# Patient Record
Sex: FEMALE | Race: WHITE | NOT HISPANIC OR LATINO | Employment: STUDENT | URBAN - METROPOLITAN AREA
[De-identification: names, ages, dates, MRNs, and addresses within clinical notes are randomized per-mention and may not be internally consistent; named-entity substitution may affect disease eponyms.]

---
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
## 2021-01-03 ENCOUNTER — MYC MEDICAL ADVICE (OUTPATIENT)
Dept: FAMILY MEDICINE | Facility: CLINIC | Age: 21
End: 2021-01-03

## 2021-01-03 DIAGNOSIS — F33.1 MODERATE EPISODE OF RECURRENT MAJOR DEPRESSIVE DISORDER (H): ICD-10-CM

## 2021-01-03 DIAGNOSIS — F41.1 GENERALIZED ANXIETY DISORDER: ICD-10-CM

## 2021-01-04 ENCOUNTER — VIRTUAL VISIT (OUTPATIENT)
Dept: FAMILY MEDICINE | Facility: CLINIC | Age: 21
End: 2021-01-04

## 2021-01-04 DIAGNOSIS — F41.1 GENERALIZED ANXIETY DISORDER: ICD-10-CM

## 2021-01-04 DIAGNOSIS — F32.0 MILD SINGLE CURRENT EPISODE OF MAJOR DEPRESSIVE DISORDER (H): Primary | ICD-10-CM

## 2021-01-04 DIAGNOSIS — Z30.41 ENCOUNTER FOR SURVEILLANCE OF CONTRACEPTIVE PILLS: ICD-10-CM

## 2021-01-04 PROBLEM — F41.9 ANXIETY: Status: RESOLVED | Noted: 2020-09-23 | Resolved: 2021-01-04

## 2021-01-04 PROCEDURE — 99214 OFFICE O/P EST MOD 30 MIN: CPT | Mod: 95 | Performed by: FAMILY MEDICINE

## 2021-01-04 RX ORDER — SERTRALINE HYDROCHLORIDE 100 MG/1
100 TABLET, FILM COATED ORAL DAILY
Qty: 30 TABLET | Refills: 0 | Status: SHIPPED | OUTPATIENT
Start: 2021-01-04 | End: 2021-01-04

## 2021-01-04 RX ORDER — SERTRALINE HYDROCHLORIDE 100 MG/1
100 TABLET, FILM COATED ORAL DAILY
Qty: 30 TABLET | Refills: 3 | Status: SHIPPED | OUTPATIENT
Start: 2021-01-04 | End: 2021-08-20

## 2021-01-04 ASSESSMENT — ANXIETY QUESTIONNAIRES
IF YOU CHECKED OFF ANY PROBLEMS ON THIS QUESTIONNAIRE, HOW DIFFICULT HAVE THESE PROBLEMS MADE IT FOR YOU TO DO YOUR WORK, TAKE CARE OF THINGS AT HOME, OR GET ALONG WITH OTHER PEOPLE: VERY DIFFICULT
3. WORRYING TOO MUCH ABOUT DIFFERENT THINGS: NEARLY EVERY DAY
1. FEELING NERVOUS, ANXIOUS, OR ON EDGE: NEARLY EVERY DAY
GAD7 TOTAL SCORE: 14
5. BEING SO RESTLESS THAT IT IS HARD TO SIT STILL: NOT AT ALL
2. NOT BEING ABLE TO STOP OR CONTROL WORRYING: NEARLY EVERY DAY
7. FEELING AFRAID AS IF SOMETHING AWFUL MIGHT HAPPEN: MORE THAN HALF THE DAYS
6. BECOMING EASILY ANNOYED OR IRRITABLE: SEVERAL DAYS

## 2021-01-04 ASSESSMENT — PATIENT HEALTH QUESTIONNAIRE - PHQ9
5. POOR APPETITE OR OVEREATING: MORE THAN HALF THE DAYS
SUM OF ALL RESPONSES TO PHQ QUESTIONS 1-9: 11

## 2021-01-04 NOTE — PROGRESS NOTES
"Israel Eaton is a 20 year old female who is being evaluated via a billable telephone visit.      What phone number would you like to be contacted at? [phone]  How would you like to obtain your AVS? [facility]  Assessment & Plan   Problem List Items Addressed This Visit     Mild single current episode of major depressive disorder (H) - Primary     Improved, much more troubled by her anxiety.  Recommend continuing sertraline at this time         Relevant Medications    sertraline (ZOLOFT) 100 MG tablet    Generalized anxiety disorder     She has yet to try Buspar. Fill Rx, recheck 2 weeks. Anxiety prominent, \"can't stop my thoughts) per pt.  She has appt with her old counselor         Relevant Medications    sertraline (ZOLOFT) 100 MG tablet    Encounter for surveillance of contraceptive pills     Pt was unaware of outstanding Rx for OCP                                         Tobacco Cessation:   reports that she has been smoking other. She has never used smokeless tobacco.            No follow-ups on file.    Sean Koo MD  [facility]     Israel Eaton is a 20 year old female who presents     HPI       Depression and Anxiety Follow-Up    How are you doing with your depression since your last visit? Improved     How are you doing with your anxiety since your last visit?  Worsened     Are you having other symptoms that might be associated with depression or anxiety? No    Have you had a significant life event? No     Do you have any concerns with your use of alcohol or other drugs? No    Social History     Tobacco Use     Smoking status: Current Every Day Smoker     Types: Other     Smokeless tobacco: Never Used     Tobacco comment: Occasionally Vapes   Substance Use Topics     Alcohol use: No     Alcohol/week: 0.0 standard drinks     Drug use: No     PHQ 9/24/2020 10/29/2020 12/1/2020   PHQ-9 Total Score 13 9 11   Q9: Thoughts of better off dead/self-harm past 2 " weeks Not at all Not at all Not at all     DAYO-7 SCORE 9/24/2020 10/29/2020 12/1/2020   Total Score 13 6 13     Last PHQ-9 1/4/2021   1.  Little interest or pleasure in doing things 0   2.  Feeling down, depressed, or hopeless 1   3.  Trouble falling or staying asleep, or sleeping too much 3   4.  Feeling tired or having little energy 2   5.  Poor appetite or overeating 0   6.  Feeling bad about yourself 1   7.  Trouble concentrating 3   8.  Moving slowly or restless 1   Q9: Thoughts of better off dead/self-harm past 2 weeks 0   PHQ-9 Total Score 11   Difficulty at work, home, or with people Somewhat difficult     DAYO-7  1/4/2021   1. Feeling nervous, anxious, or on edge 3   2. Not being able to stop or control worrying 3   3. Worrying too much about different things 3   4. Trouble relaxing 2   5. Being so restless that it is hard to sit still 0   6. Becoming easily annoyed or irritable 1   7. Feeling afraid, as if something awful might happen 2   DAYO-7 Total Score 14   If you checked any problems, how difficult have they made it for you to do your work, take care of things at home, or get along with other people? Very difficult       Suicide Assessment Five-step Evaluation and Treatment (SAFE-T)      How many servings of fruits and vegetables do you eat daily?  0-1    On average, how many sweetened beverages do you drink each day (Examples: soda, juice, sweet tea, etc.  Do NOT count diet or artificially sweetened beverages)?   3    How many days per week do you exercise enough to make your heart beat faster? 3 or less    How many minutes a day do you exercise enough to make your heart beat faster? 30 - 60  How many days per week do you miss taking your medication? 2-4    What makes it hard for you to take your medications?  remembering to take        Review of Systems   She cannot stop her thoughts.  She is not suicidal.  No medicine intolerance      Objective           Vitals:  No vitals were obtained today due to  virtual visit.    Physical Exam   no distress and scattered  PSYCH: Alert and oriented times 3; coherent speech, normal   rate and volume, thought is tangential  RESP: No cough, no audible wheezing, able to talk in full sentences  Remainder of exam unable to be completed due to telephone visits                Phone call duration: 7 minutes

## 2021-01-04 NOTE — ASSESSMENT & PLAN NOTE
"She has yet to try Buspar. Fill Rx, recheck 2 weeks. Anxiety prominent, \"can't stop my thoughts) per pt.  She has appt with her old counselor  "

## 2021-01-05 ASSESSMENT — ANXIETY QUESTIONNAIRES: GAD7 TOTAL SCORE: 14

## 2021-08-20 DIAGNOSIS — F41.1 GENERALIZED ANXIETY DISORDER: ICD-10-CM

## 2021-08-20 DIAGNOSIS — Z30.09 BIRTH CONTROL COUNSELING: ICD-10-CM

## 2021-08-20 RX ORDER — SERTRALINE HYDROCHLORIDE 100 MG/1
100 TABLET, FILM COATED ORAL DAILY
Qty: 30 TABLET | Refills: 0 | Status: SHIPPED | OUTPATIENT
Start: 2021-08-20 | End: 2021-09-29

## 2021-08-20 RX ORDER — NORETHINDRONE ACETATE AND ETHINYL ESTRADIOL .03; 1.5 MG/1; MG/1
1 TABLET ORAL DAILY
Qty: 84 TABLET | Refills: 0 | Status: SHIPPED | OUTPATIENT
Start: 2021-08-20 | End: 2021-11-30

## 2021-08-20 NOTE — TELEPHONE ENCOUNTER
"Prescription approved per [facility] Refill Protocol.  Satya TRAN RN

## 2021-08-20 NOTE — TELEPHONE ENCOUNTER
Reason for Call:  Other prescription    Detailed comments: Patient is requesting medication for both; sertraline (ZOLOFT) 100 MG norethindrone-ethinyl estradiol (MICROGESTIN 1.5/30) 1.5-30 MG-MCG tablet. Patient has checked with pharmacy no refills available. Please call patient with any updates.    Phone Number Patient can be reached at: Cell number on file:    Telephone Information:   Mobile [phone]       Best Time: anytime    Can we leave a detailed message on this number? YES    Call taken on 8/20/2021 at 2:40 PM by Jeniffer Cartagena

## 2021-09-13 ENCOUNTER — OFFICE VISIT (OUTPATIENT)
Dept: FAMILY MEDICINE | Facility: CLINIC | Age: 21
End: 2021-09-13
Payer: COMMERCIAL

## 2021-09-13 ENCOUNTER — NURSE TRIAGE (OUTPATIENT)
Dept: NURSING | Facility: CLINIC | Age: 21
End: 2021-09-13

## 2021-09-13 VITALS
SYSTOLIC BLOOD PRESSURE: 118 MMHG | BODY MASS INDEX: 21.6 KG/M2 | HEIGHT: 64 IN | OXYGEN SATURATION: 96 % | WEIGHT: 126.5 LBS | DIASTOLIC BLOOD PRESSURE: 82 MMHG | HEART RATE: 78 BPM | TEMPERATURE: 98.4 F

## 2021-09-13 DIAGNOSIS — Z11.3 SCREENING FOR STDS (SEXUALLY TRANSMITTED DISEASES): ICD-10-CM

## 2021-09-13 DIAGNOSIS — Z00.00 ROUTINE GENERAL MEDICAL EXAMINATION AT A HEALTH CARE FACILITY: Primary | ICD-10-CM

## 2021-09-13 DIAGNOSIS — K90.0 CELIAC DISEASE: ICD-10-CM

## 2021-09-13 DIAGNOSIS — H61.23 BILATERAL IMPACTED CERUMEN: ICD-10-CM

## 2021-09-13 DIAGNOSIS — Z11.59 NEED FOR HEPATITIS C SCREENING TEST: ICD-10-CM

## 2021-09-13 DIAGNOSIS — Z23 NEED FOR PROPHYLACTIC VACCINATION AND INOCULATION AGAINST INFLUENZA: ICD-10-CM

## 2021-09-13 DIAGNOSIS — Z23 NEED FOR VACCINATION: ICD-10-CM

## 2021-09-13 DIAGNOSIS — Z11.4 SCREENING FOR HIV (HUMAN IMMUNODEFICIENCY VIRUS): ICD-10-CM

## 2021-09-13 LAB
ERYTHROCYTE [DISTWIDTH] IN BLOOD BY AUTOMATED COUNT: 14.8 % (ref 10–15)
HCT VFR BLD AUTO: 40.7 % (ref 35–47)
HGB BLD-MCNC: 13.3 G/DL (ref 11.7–15.7)
MCH RBC QN AUTO: 28.4 PG (ref 26.5–33)
MCHC RBC AUTO-ENTMCNC: 32.7 G/DL (ref 31.5–36.5)
MCV RBC AUTO: 87 FL (ref 78–100)
PLATELET # BLD AUTO: 290 10E3/UL (ref 150–450)
RBC # BLD AUTO: 4.69 10E6/UL (ref 3.8–5.2)
WBC # BLD AUTO: 7.4 10E3/UL (ref 4–11)

## 2021-09-13 PROCEDURE — 69209 REMOVE IMPACTED EAR WAX UNI: CPT | Mod: 50 | Performed by: FAMILY MEDICINE

## 2021-09-13 PROCEDURE — 90732 PPSV23 VACC 2 YRS+ SUBQ/IM: CPT | Performed by: FAMILY MEDICINE

## 2021-09-13 PROCEDURE — 80053 COMPREHEN METABOLIC PANEL: CPT | Performed by: FAMILY MEDICINE

## 2021-09-13 PROCEDURE — 85027 COMPLETE CBC AUTOMATED: CPT | Performed by: FAMILY MEDICINE

## 2021-09-13 PROCEDURE — 99395 PREV VISIT EST AGE 18-39: CPT | Mod: 25 | Performed by: FAMILY MEDICINE

## 2021-09-13 PROCEDURE — 87491 CHLMYD TRACH DNA AMP PROBE: CPT | Mod: XS | Performed by: FAMILY MEDICINE

## 2021-09-13 PROCEDURE — 99000 SPECIMEN HANDLING OFFICE-LAB: CPT | Performed by: FAMILY MEDICINE

## 2021-09-13 PROCEDURE — 36415 COLL VENOUS BLD VENIPUNCTURE: CPT | Performed by: FAMILY MEDICINE

## 2021-09-13 PROCEDURE — 86803 HEPATITIS C AB TEST: CPT | Performed by: FAMILY MEDICINE

## 2021-09-13 PROCEDURE — 90472 IMMUNIZATION ADMIN EACH ADD: CPT | Performed by: FAMILY MEDICINE

## 2021-09-13 PROCEDURE — 82784 ASSAY IGA/IGD/IGG/IGM EACH: CPT | Performed by: FAMILY MEDICINE

## 2021-09-13 PROCEDURE — 86256 FLUORESCENT ANTIBODY TITER: CPT | Mod: 90 | Performed by: FAMILY MEDICINE

## 2021-09-13 PROCEDURE — 84443 ASSAY THYROID STIM HORMONE: CPT | Performed by: FAMILY MEDICINE

## 2021-09-13 PROCEDURE — 90686 IIV4 VACC NO PRSV 0.5 ML IM: CPT | Performed by: FAMILY MEDICINE

## 2021-09-13 PROCEDURE — 83516 IMMUNOASSAY NONANTIBODY: CPT | Performed by: FAMILY MEDICINE

## 2021-09-13 PROCEDURE — 90471 IMMUNIZATION ADMIN: CPT | Performed by: FAMILY MEDICINE

## 2021-09-13 PROCEDURE — 81376 HLA II TYPING 1 LOCUS LR: CPT | Performed by: FAMILY MEDICINE

## 2021-09-13 PROCEDURE — 87389 HIV-1 AG W/HIV-1&-2 AB AG IA: CPT | Performed by: FAMILY MEDICINE

## 2021-09-13 ASSESSMENT — ENCOUNTER SYMPTOMS
FEVER: 0
CHILLS: 0
DIARRHEA: 0
HEADACHES: 1
SHORTNESS OF BREATH: 0
DYSURIA: 0
JOINT SWELLING: 0
FREQUENCY: 0
WEAKNESS: 0
COUGH: 0
MYALGIAS: 0
EYE PAIN: 0
ABDOMINAL PAIN: 0
HEMATURIA: 0
PALPITATIONS: 0
HEMATOCHEZIA: 0
HEARTBURN: 0
NAUSEA: 0
ARTHRALGIAS: 0
NERVOUS/ANXIOUS: 1
SORE THROAT: 0
DIZZINESS: 0
CONSTIPATION: 0
PARESTHESIAS: 0

## 2021-09-13 ASSESSMENT — ANXIETY QUESTIONNAIRES
7. FEELING AFRAID AS IF SOMETHING AWFUL MIGHT HAPPEN: NOT AT ALL
3. WORRYING TOO MUCH ABOUT DIFFERENT THINGS: NEARLY EVERY DAY
GAD7 TOTAL SCORE: 13
7. FEELING AFRAID AS IF SOMETHING AWFUL MIGHT HAPPEN: NOT AT ALL
1. FEELING NERVOUS, ANXIOUS, OR ON EDGE: NEARLY EVERY DAY
GAD7 TOTAL SCORE: 13
2. NOT BEING ABLE TO STOP OR CONTROL WORRYING: NEARLY EVERY DAY
4. TROUBLE RELAXING: SEVERAL DAYS
5. BEING SO RESTLESS THAT IT IS HARD TO SIT STILL: SEVERAL DAYS
GAD7 TOTAL SCORE: 13
8. IF YOU CHECKED OFF ANY PROBLEMS, HOW DIFFICULT HAVE THESE MADE IT FOR YOU TO DO YOUR WORK, TAKE CARE OF THINGS AT HOME, OR GET ALONG WITH OTHER PEOPLE?: VERY DIFFICULT
6. BECOMING EASILY ANNOYED OR IRRITABLE: MORE THAN HALF THE DAYS

## 2021-09-13 ASSESSMENT — PATIENT HEALTH QUESTIONNAIRE - PHQ9
SUM OF ALL RESPONSES TO PHQ QUESTIONS 1-9: 8
SUM OF ALL RESPONSES TO PHQ QUESTIONS 1-9: 8
10. IF YOU CHECKED OFF ANY PROBLEMS, HOW DIFFICULT HAVE THESE PROBLEMS MADE IT FOR YOU TO DO YOUR WORK, TAKE CARE OF THINGS AT HOME, OR GET ALONG WITH OTHER PEOPLE: SOMEWHAT DIFFICULT

## 2021-09-13 ASSESSMENT — MIFFLIN-ST. JEOR: SCORE: 1328.8

## 2021-09-13 NOTE — NURSING NOTE
Future Appointments   Date Time Provider Department Center   9/13/2021  4:40 PM [facility] MD [facility]     Appointment Notes for this encounter:   Ok per [facility]- I would like to have a wellness check up but also discuss my celiac disease.  I think I need to be tested for it.    Health Maintenance Due   Topic Date Due     CHLAMYDIA SCREENING  Never done     Pneumococcal Vaccine: Pediatrics (0 to 5 Years) and At-Risk Patients (6 to 64 Years) (1 of 2 - PPSV23) 07/21/2007     HIV SCREENING  Never done     PREVENTIVE CARE VISIT  03/10/2018     HEPATITIS C SCREENING  Never done     TSH W/FREE T4 REFLEX  11/30/2019     PHQ-9  07/04/2021     INFLUENZA VACCINE (1) 09/01/2021     ANNUAL REVIEW OF HM ORDERS  09/24/2021     Health Maintenance addressed:  Flu Vaccine    Immunizations Pneumoccal    MyChart Status:  Active and Using

## 2021-09-13 NOTE — PROGRESS NOTES
SUBJECTIVE:   CC: Israel Eaton is an 21 year old woman who presents for preventive health visit.     Needs a physical.    Would like to get retested for celiac disease.  Would like to get blood testing this time.  Any gluten consumption causes stomach pain.  Some problems with diarrhea.  Stomach gets irritated.    Needs her flu shot, and another vaccine.    Would like her ears cleaned today.    Patient has been advised of split billing requirements and indicates understanding: Yes  Healthy Habits:     Getting at least 3 servings of Calcium per day:  Yes    Bi-annual eye exam:  NO    Dental care twice a year:  NO    Sleep apnea or symptoms of sleep apnea:  Daytime drowsiness    Diet:  Gluten-free/reduced    Frequency of exercise:  1 day/week    Duration of exercise:  Less than 15 minutes    Taking medications regularly:  Yes    Barriers to taking medications:  Problems remembering to take them    Medication side effects:  None    PHQ-2 Total Score: 2    Additional concerns today:  No           Today's PHQ-2 Score:   PHQ-2 ( 1999 Pf[employer]) 9/13/2021   Q1: Little interest or pleasure in doing things 1   Q2: Feeling down, depressed or hopeless 1   PHQ-2 Score 2   Q1: Little interest or pleasure in doing things Several days   Q2: Feeling down, depressed or hopeless Several days   PHQ-2 Score 2       PHQ 12/1/2020 1/4/2021 9/13/2021   PHQ-9 Total Score 11 11 8   Q9: Thoughts of better off dead/self-harm past 2 weeks Not at all Not at all Not at all     DAYO-7 SCORE 1/4/2021 8/21/2021 9/13/2021   Total Score - 19 (severe anxiety) 13 (moderate anxiety)   Total Score 14 19 13           Abuse: Current or Past (Physical, Sexual or Emotional) - No  Do you feel safe in your environment? Yes    Have you ever done Advance Care Planning? (For example, a Health Directive, POLST, or a discussion with a medical provider or your loved ones about your wishes): No, advance care planning information given to patient to review.   Patient plans to discuss their wishes with loved ones or provider.      Social History     Tobacco Use     Smoking status: Former Smoker     Types: Other     Smokeless tobacco: Never Used     Tobacco comment: quit end of August 2021   Substance Use Topics     Alcohol use: No     Alcohol/week: 0.0 standard drinks     If you drink alcohol do you typically have >3 drinks per day or >7 drinks per week? Not applicable    Alcohol Use 9/13/2021   Prescreen: >3 drinks/day or >7 drinks/week? No   Prescreen: >3 drinks/day or >7 drinks/week? -       Reviewed orders with patient.  Reviewed health maintenance and updated orders accordingly - Yes  Lab work is in process  Labs reviewed in EPIC  BP Readings from Last 3 Encounters:   09/13/21 118/82   09/24/20 124/88   10/29/19 128/80    Wt Readings from Last 3 Encounters:   09/13/21 57.4 kg (126 lb 8 oz)   10/29/20 61.2 kg (135 lb) (64 %, Z= 0.35)*   09/24/20 64.9 kg (143 lb) (75 %, Z= 0.67)*     * Growth percentiles are based on CDC (Girls, 2-20 Years) data.                  Patient Active Problem List   Diagnosis     Goiter     H/O malignant neoplasm of parotid gland     Chronic rhinitis     Mild single current episode of major depressive disorder (H)     Celiac disease     Generalized anxiety disorder     Encounter for surveillance of contraceptive pills     History reviewed. No pertinent surgical history.    Social History     Tobacco Use     Smoking status: Former Smoker     Types: Other     Smokeless tobacco: Never Used     Tobacco comment: quit end of August 2021   Substance Use Topics     Alcohol use: No     Alcohol/week: 0.0 standard drinks     Family History   Problem Relation Age of Onset     No Known Problems Mother      No Known Problems Father          Current Outpatient Medications   Medication Sig Dispense Refill     norethindrone-ethinyl estradiol (MICROGESTIN 1.5/30) 1.5-30 MG-MCG tablet Take 1 tablet by mouth daily 84 tablet 0     sertraline (ZOLOFT) 100 MG  tablet Take 1 tablet (100 mg) by mouth daily 30 tablet 0     Allergies   Allergen Reactions     Barley Grass      [place] Grass      Wheat Bran      Recent Labs   Lab Test 09/24/20  1806 11/30/18  1810 07/01/16  1039   CR 0.71  --   --    GFRESTIMATED >90  --   --    GFRESTBLACK >90  --   --    POTASSIUM 3.9  --   --    TSH  --  2.07 1.90        Breast Cancer Screening:        History of abnormal Pap smear: NO - under age 21, PAP not appropriate for age     Reviewed and updated as needed this visit by clinical staff  Tobacco  Allergies  Meds   Med Hx  Surg Hx  Fam Hx  Soc Hx        Reviewed and updated as needed this visit by Provider  Tobacco  Allergies  Meds    Surg Hx  Fam Hx         Past Medical History:   Diagnosis Date     Chronic rhinitis 7/1/2016     Goiter 7/1/2016     H/O malignant neoplasm of parotid gland 7/1/2016     Mild single current episode of major depressive disorder (H) 3/10/2017    [facility]      History reviewed. No pertinent surgical history.    Review of Systems   Constitutional: Negative for chills and fever.   HENT: Negative for congestion, ear pain, hearing loss and sore throat.    Eyes: Negative for pain and visual disturbance.   Respiratory: Negative for cough and shortness of breath.    Cardiovascular: Negative for chest pain, palpitations and peripheral edema.   Gastrointestinal: Negative for abdominal pain, constipation, diarrhea, heartburn, hematochezia and nausea.   Genitourinary: Negative for dysuria, frequency, genital sores, hematuria and urgency.   Musculoskeletal: Negative for arthralgias, joint swelling and myalgias.   Skin: Negative for rash.   Neurological: Positive for headaches. Negative for dizziness, weakness and paresthesias.   Psychiatric/Behavioral: Positive for mood changes. The patient is nervous/anxious.           OBJECTIVE:   /82 (BP Location: Right arm, Patient Position: Sitting, Cuff Size: Adult Regular)   Pulse  "78   Temp 98.4  F (36.9  C) (Oral)   Ht 1.626 m (5' 4\")   Wt 57.4 kg (126 lb 8 oz)   LMP 09/12/2021 (Exact Date)   SpO2 96%   BMI 21.71 kg/m    Physical Exam  GENERAL: healthy, alert and no distress  EYES: Eyes grossly normal to inspection, PERRL and conjunctivae and sclerae normal  HENT: normal cephalic/atraumatic, both ears: occluded with wax, nose and mouth without ulcers or lesions, oropharynx clear and oral mucous membranes moist  NECK: no adenopathy, no asymmetry, masses, or scars and thyroid normal to palpation  RESP: lungs clear to auscultation - no rales, rhonchi or wheezes  CV: regular rate and rhythm, normal S1 S2, no S3 or S4, no murmur, click or rub, no peripheral edema and peripheral pulses strong  ABDOMEN: soft, nontender, no hepatosplenomegaly, no masses and bowel sounds normal  MS: no gross musculoskeletal defects noted, no edema  SKIN: no suspicious lesions or rashes  NEURO: Normal strength and tone, mentation intact and speech normal  PSYCH: mentation appears normal, affect normal/bright    Diagnostic Test Results:  Labs reviewed in Epic    ASSESSMENT/PLAN:       ICD-10-CM    1. Routine general medical examination at a health care facility  Z00.00 TSH with free T4 reflex     CBC with platelets     Comprehensive metabolic panel (BMP + Alb, Alk Phos, ALT, AST, Total. Bili, TP)     TSH with free T4 reflex     CBC with platelets     Comprehensive metabolic panel (BMP + Alb, Alk Phos, ALT, AST, Total. Bili, TP)   2. Celiac disease  K90.0 IgA [LAB73]     Deamidated Giladin Peptide Kat IgA IgG [FWR6955]     Tissue transglutaminase kat IgA and IgG [AXL5413]     Endomysial Antibody IgA by IFA [EXZ2848]     HLA DQB1 for Celiac Disease [FSI1989]     IgA [LAB73]     Deamidated Giladin Peptide Kat IgA IgG [EMA0835]     Tissue transglutaminase kat IgA and IgG [WGP2542]     Endomysial Antibody IgA by IFA [[id number]]     HLA DQB1 for Celiac Disease [GFB2996]   3. Bilateral impacted cerumen  H61.23 [place] REMOVAL " "IMPACTED CERUMEN IRRIGATION/LVG UNILAT   4. Screening for STDs (sexually transmitted diseases)  Z11.3 Chlamydia trachomatis PCR     Chlamydia trachomatis PCR   5. Screening for HIV (human immunodeficiency virus)  Z11.4 HIV Antigen Antibody Combo     HIV Antigen Antibody Combo   6. Need for hepatitis C screening test  Z11.59 Hepatitis C Screen Reflex to HCV RNA Quant and Genotype     Hepatitis C Screen Reflex to HCV RNA Quant and Genotype   7. Need for prophylactic vaccination and inoculation against influenza  Z23 INFLUENZA VACCINE IM > 6 MONTHS VALENT IIV4 (AFLURIA/FLUZONE)   8. Need for vaccination  Z23 Pneumococcal vaccine 23 valent PPSV23  (Pneumovax) [[id number]]     Exam completed today, routine health maintenance items updated as able.  Labs ordered for celiac disease and health maintenance.  Follow up one year or sooner as needed.    Patient has been advised of split billing requirements and indicates understanding: Yes  COUNSELING:  Reviewed preventive health counseling, as reflected in patient instructions     Estimated body mass index is 21.71 kg/m  as calculated from the following:    Height as of this encounter: 1.626 m (5' 4\").    Weight as of this encounter: 57.4 kg (126 lb 8 oz).        She reports that she has quit smoking. Her smoking use included other. She has never used smokeless tobacco.  Tobacco Cessation Action Plan:   Patient recently stopped using a vape/e-cigarette, which she used to stop smoking.      Counseling Resources:  ATP IV Guidelines  Pooled Cohorts Equation Calculator  Breast Cancer Risk Calculator  BRCA-Related Cancer Risk Assessment: FHS-7 Tool  FRAX Risk Assessment  ICSI Preventive Guidelines  Dietary Guidelines for Americans, 2010  USDA's MyPlate  ASA Prophylaxis  Lung CA Screening    April J. Celeste Mg MD  [facility]  Answers for HPI/ROS submitted by the patient on 9/13/2021  If you checked off any problems, how difficult have these problems made it " for you to do your work, take care of things at home, or get along with other people?: Somewhat difficult  PHQ9 TOTAL SCORE: 8  DAYO 7 TOTAL SCORE: 13

## 2021-09-14 LAB
ALBUMIN SERPL-MCNC: 3.6 G/DL (ref 3.4–5)
ALP SERPL-CCNC: 47 U/L (ref 40–150)
ALT SERPL W P-5'-P-CCNC: 27 U/L (ref 0–50)
ANION GAP SERPL CALCULATED.3IONS-SCNC: 4 MMOL/L (ref 3–14)
AST SERPL W P-5'-P-CCNC: 20 U/L (ref 0–45)
BILIRUB SERPL-MCNC: 0.2 MG/DL (ref 0.2–1.3)
BUN SERPL-MCNC: 18 MG/DL (ref 7–30)
CALCIUM SERPL-MCNC: 9.1 MG/DL (ref 8.5–10.1)
CHLORIDE BLD-SCNC: 110 MMOL/L (ref 94–109)
CO2 SERPL-SCNC: 25 MMOL/L (ref 20–32)
CREAT SERPL-MCNC: 0.74 MG/DL (ref 0.52–1.04)
GFR SERPL CREATININE-BSD FRML MDRD: >90 ML/MIN/1.73M2
GLUCOSE BLD-MCNC: 90 MG/DL (ref 70–99)
HCV AB SERPL QL IA: NONREACTIVE
HIV 1+2 AB+HIV1 P24 AG SERPL QL IA: NONREACTIVE
POTASSIUM BLD-SCNC: 4.1 MMOL/L (ref 3.4–5.3)
PROT SERPL-MCNC: 7.7 G/DL (ref 6.8–8.8)
SODIUM SERPL-SCNC: 139 MMOL/L (ref 133–144)
TSH SERPL DL<=0.005 MIU/L-ACNC: 2.46 MU/L (ref 0.4–4)

## 2021-09-14 ASSESSMENT — PATIENT HEALTH QUESTIONNAIRE - PHQ9: SUM OF ALL RESPONSES TO PHQ QUESTIONS 1-9: 8

## 2021-09-14 ASSESSMENT — ANXIETY QUESTIONNAIRES: GAD7 TOTAL SCORE: 13

## 2021-09-14 NOTE — TELEPHONE ENCOUNTER
At clinic earlier and they said they would send results on [employer].  Most of patient's labs are still pending results, and provider has not left a note on those that are back.     Patient will check back tomorrow on lab results.    Sena García RN  [facility]      Reason for Disposition    [1] Caller requesting NON-URGENT health information AND [2] PCP's office is the best resource    Protocols used: INFORMATION ONLY CALL - NO TRIAGE-A-

## 2021-09-15 ENCOUNTER — TELEPHONE (OUTPATIENT)
Dept: FAMILY MEDICINE | Facility: CLINIC | Age: 21
End: 2021-09-15

## 2021-09-15 LAB
C TRACH DNA SPEC QL NAA+PROBE: NEGATIVE
GLIADIN IGA SER-ACNC: 3.2 U/ML
GLIADIN IGG SER-ACNC: 2.4 U/ML
TTG IGA SER-ACNC: 1.8 U/ML
TTG IGG SER-ACNC: 0.8 U/ML

## 2021-09-15 NOTE — TELEPHONE ENCOUNTER
Received call from pt wanting to know the interpretation of her lab tests for celiac disease  Labs have not been reviewed by provider    Routing to provider to review and advise    Thank you  Sita Blackmon RN on 9/15/2021 at 5:28 PM

## 2021-09-15 NOTE — TELEPHONE ENCOUNTER
Labs reviewed in chart, sent response via [employer].  Labs so far look NEGATIVE for celiac disease

## 2021-09-16 LAB
ENDOMYSIUM IGA TITR SER IF: NORMAL {TITER}
IGA SERPL-MCNC: 225 MG/DL (ref 84–499)

## 2021-09-17 LAB
DQA1* LOCUS: NORMAL
DQA1*: NORMAL
DQB1* LOCUS: NORMAL
DQB1*: NORMAL
DRSSO TEST METHOD: NORMAL
ZZZDRSSO COMMENTS: NORMAL

## 2021-09-29 DIAGNOSIS — F41.1 GENERALIZED ANXIETY DISORDER: ICD-10-CM

## 2021-09-29 RX ORDER — SERTRALINE HYDROCHLORIDE 100 MG/1
100 TABLET, FILM COATED ORAL DAILY
Qty: 30 TABLET | Refills: 0 | Status: SHIPPED | OUTPATIENT
Start: 2021-09-29 | End: 2021-11-03

## 2021-09-29 NOTE — TELEPHONE ENCOUNTER
Routing refill request to provider for review/approval because:  Elevated PHQ-9    Jackeline Russo, RN   [facility]  -- Triage Nurse

## 2021-09-29 NOTE — TELEPHONE ENCOUNTER
PHQ 12/1/2020 1/4/2021 9/13/2021   PHQ-9 Total Score 11 11 8   Q9: Thoughts of better off dead/self-harm past 2 weeks Not at all Not at all Not at all     30 d sent  Needs visit, PHQ-9 virtual OK  Was PHQ done 9/13?  Sean Koo MD

## 2021-11-01 DIAGNOSIS — F41.1 GENERALIZED ANXIETY DISORDER: ICD-10-CM

## 2021-11-03 RX ORDER — SERTRALINE HYDROCHLORIDE 100 MG/1
TABLET, FILM COATED ORAL
Qty: 30 TABLET | Refills: 2 | Status: SHIPPED | OUTPATIENT
Start: 2021-11-03

## 2021-11-03 NOTE — TELEPHONE ENCOUNTER
PHQ 12/1/2020 1/4/2021 9/13/2021   PHQ-9 Total Score 11 11 8   Q9: Thoughts of better off dead/self-harm past 2 weeks Not at all Not at all Not at all       Rx sent  Visit due Pavan Koo MD

## 2021-11-03 NOTE — TELEPHONE ENCOUNTER
See below.  Polly Feliz, RN      October 15, 2021    Blanca Welsh    2:17 PM  Note     LM for pt to return call  Blanca Welsh/        September 29, 2021         BW    5:46 PM  Sean Koo MD routed this conversation to [facility] Team (Ma/Tc)       Sean Koo MD         5:46 PM  Note     PHQ 12/1/2020 1/4/2021 9/13/2021   PHQ-9 Total Score 11 11 8   Q9: Thoughts of better off dead/self-harm past 2 weeks Not at all Not at all Not at all      30 d sent  Needs visit, PHQ-9 virtual OK  Was PHQ done 9/13?  Sean Koo MD

## 2021-11-08 ENCOUNTER — NURSE TRIAGE (OUTPATIENT)
Dept: NURSING | Facility: CLINIC | Age: 21
End: 2021-11-08
Payer: COMMERCIAL

## 2021-11-08 NOTE — TELEPHONE ENCOUNTER
Pt called in states she is she has question about home covid-19 test kit.  The question was answered, Pt verbalized understand, no other concern at this time.      Jose Stanley Nurse Advisor 11/8/2021 4:54 PM

## 2021-11-08 NOTE — TELEPHONE ENCOUNTER
Pt is calling.    Mom was diagnosed with strep throat and bronchitis and positive COVID on Friday.  Positive exposure to COVID through her mom.  Not feeling well all day today. Woke up today, not feeling well.  Stomach cramping off and on. Body aches. HA  Nausea. No vomiting. Denies fever.  Denies nasal congestion, runny nose, sore throat, cough, wheezing, shortness of breath, chest pain.  Pt is fully vaccinated for COVID.    Care advice reviewed. When to call back reviewed per care advice.  She verbalized understanding.  She has a home test and is going to do that later.  Will treat as positive COVID for now until tested since symptomatic.    COVID 19 Nurse Triage Plan/Patient Instructions    Please be aware that novel coronavirus (COVID-19) may be circulating in the community. If you develop symptoms such as fever, cough, or SOB or if you have concerns about the presence of another infection including coronavirus (COVID-19), please contact your health care provider or visit [email or web address].     Disposition/Instructions    Virtual Visit with provider recommended. Reference Visit Selection Guide.[email or web address]    Thank you for taking steps to prevent the spread of this virus.  o Limit your contact with others.  o Wear a simple mask to cover your cough.  o Wash your hands well and often.    Resources    [facility]: About COVID-19: [email or web address]    CDC: What to Do If You're Sick: www.cdc.gov/coronavirus/2019-ncov/about/steps-when-sick.html    CDC: Ending Home Isolation: www.cdc.gov/coronavirus/2019-ncov/hcp/disposition-in-home-patients.html     CDC: Caring for Someone: www.cdc.gov/coronavirus/2019-ncov/if-you-are-sick/care-for-someone.html     [facility]: Interim Guidance for Hospital Discharge to Home: www.health.[facility].mn.us/diseases/coronavirus/hcp/hospdischarge.pdf    [facility] clinical trials (COVID-19 research studies): clinicalaffairs.[facility]-clinical-trials     Below are  the COVID-19 hotlines at the [place] Department of Health ([facility]). Interpreters are available.   o For health questions: Call 547-930-3326 or 1-528.284.6795 (7 a.m. to 7 p.m.)  o For questions about schools and childcare: Call 595-626-5235 or 1-251.387.5480 (7 a.m. to 7 p.m.)     Reason for Disposition    [1] COVID-19 infection suspected by caller or triager AND [2] mild symptoms (cough, fever, or others) AND [3] no complications or SOB    Additional Information    Negative: Severe difficulty breathing (struggling for each breath, unable to speak or cry, making grunting noises with each breath, severe retractions) (Triage tip: Listen to the child's breathing.)    Negative: Slow, shallow, weak breathing    Negative: [1] Bluish (or gray) lips or face now AND [2] persists when not coughing    Negative: Difficult to awaken or not alert when awake (confusion)    Negative: Very weak (doesn't move or make eye contact)    Negative: Sounds like a life-threatening emergency to the triager    Negative: Runny nose from nasal allergies    Negative: [1] Headache is isolated symptom (no fever) AND [2] no known COVID-19 close contact    Negative: [1] Vomiting is isolated symptom (no fever) AND [2] no known COVID-19 close contact    Negative: [1] Diarrhea is isolated symptom (no fever) AND [2] no known COVID-19 close contact    Negative: [1] COVID-19 exposure AND [2] NO symptoms    Negative: [1] COVID-19 vaccine series completed (fully vaccinated) in past 3 months AND [2] new-onset of possible COVID-19 symptoms BUT [3] no known exposure    Negative: [1] Had lab test confirmed COVID-19 infection within last 3 months AND [2] new-onset of COVID-19 possible symptoms BUT [3] no known exposure    Negative: [1] Diagnosed with influenza within the last 2 weeks by a HCP AND [2] follow-up call    Negative: [1] Household exposure to known influenza (flu test positive) AND [2] child with influenza-like symptoms    Negative: [1] Difficulty  breathing confirmed by triager BUT [2] not severe (Triage tip: Listen to the child's breathing.)    Negative: Ribs are pulling in with each breath (retractions)    Negative: [1] Age < 12 weeks AND [2] fever 100.4 F (38.0 C) or higher rectally    Negative: SEVERE chest pain or pressure (excruciating)    Negative: [1] Stridor (harsh sound with breathing in) AND [2] present now OR has occurred 2 or more times    Negative: Rapid breathing (Breaths/min > 60 if < 2 mo; > 50 if 2-12 mo; > 40 if 1-5 years; > 30 if 6-11 years; > 20 if > 12 years)    Negative: [1] MODERATE chest pain or pressure (by caller's report) AND [2] can't take a deep breath    Negative: [1] Fever AND [2] > 105 F (40.6 C) by any route OR axillary > 104 F (40 C)    Negative: [1] Shaking chills (shivering) AND [2] present constantly > 30 minutes    Negative: [1] Sore throat AND [2] complication suspected (refuses to drink, can't swallow fluids, new-onset drooling, can't move neck normally or other serious symptom)    Negative: [1] Muscle or body pains AND [2] complication suspected (can't stand, can't walk, can barely walk, can't move arm or hand normally or other serious symptom)    Negative: [1] Headache AND [2] complication suspected (stiff neck, incapacitated by pain, worst headache ever, confused, weakness or other serious symptom)    Negative: [1] Dehydration suspected AND [2] age < 1 year (signs: no urine > 8 hours AND very dry mouth, no  tears, ill-appearing, etc.)    Negative: [1] Dehydration suspected AND [2] age > 1 year (signs: no urine > 12 hours AND very dry mouth, no tears, ill-appearing, etc.)    Negative: Child sounds very sick or weak to the triager    Negative: [1] Wheezing confirmed by triager AND [2] no trouble breathing (Exception: known asthmatic)    Negative: [1] Lips or face have turned bluish BUT [2] only during coughing fits    Negative: [1] Age < 3 months AND [2] lots of coughing    Negative: [1] Crying continuously AND [2]  cannot be comforted AND [3] present > 2 hours    Negative: SEVERE RISK patient (e.g., immuno-compromised, serious lung disease, on oxygen, heart disease, bedridden, etc)    Negative: [1] Age less than 12 weeks AND [2] suspected COVID-19 with mild symptoms    Negative: Multisystem Inflammatory Syndrome (MIS-C) suspected (Fever AND 2 or more of the following:  widespread red rash, red eyes, red lips, red palms/soles, swollen hands/feet, abdominal pain, vomiting, diarrhea)    Negative: [1] Stridor (harsh sound with breathing in) occurred BUT [2] not present now    Negative: [1] Continuous coughing keeps from playing or sleeping AND [2] no improvement using cough treatment per guideline    Negative: Earache or ear discharge also present    Negative: Strep throat infection suspected by triager    Negative: [1] Age 3-6 months AND [2] fever present > 24 hours AND [3] without other symptoms (no cold, cough, diarrhea, etc.)    Negative: [1] Age 6 - 24 months AND [2] fever present > 24 hours AND [3] without other symptoms (no cold, diarrhea, etc.) AND [4] fever > 102 F (39 C) by any route OR axillary > 101 F (38.3 C)    Negative: [1] Fever returns after gone for over 24 hours AND [2] symptoms worse or not improved    Negative: Fever present > 3 days (72 hours)    Negative: [1] Age > 5 years AND [2] sinus pain around cheekbone or eye (not just congestion) AND [3] fever    Negative: [1] Influenza also widespread in the community AND [2] mild flu-like symptoms WITH FEVER AND [3] HIGH-RISK patient for complications with Flu  (See that CDC List)    Protocols used: CORONAVIRUS (COVID-19) DIAGNOSED OR HAEPUPEXZ-U-SA 3.25    Nicole Ely RN  [facility] Nurse Advisor  11/8/2021 at 4:28 PM

## 2021-11-10 ENCOUNTER — OFFICE VISIT (OUTPATIENT)
Dept: URGENT CARE | Facility: URGENT CARE | Age: 21
End: 2021-11-10
Payer: COMMERCIAL

## 2021-11-10 VITALS
RESPIRATION RATE: 16 BRPM | HEART RATE: 71 BPM | DIASTOLIC BLOOD PRESSURE: 68 MMHG | OXYGEN SATURATION: 99 % | SYSTOLIC BLOOD PRESSURE: 107 MMHG | TEMPERATURE: 98.3 F

## 2021-11-10 DIAGNOSIS — R10.84 STOMACH CRAMPS, GENERALIZED: Primary | ICD-10-CM

## 2021-11-10 DIAGNOSIS — Z20.822 EXPOSURE TO 2019 NOVEL CORONAVIRUS: ICD-10-CM

## 2021-11-10 LAB
FLUAV AG SPEC QL IA: NEGATIVE
FLUBV AG SPEC QL IA: NEGATIVE

## 2021-11-10 PROCEDURE — U0005 INFEC AGEN DETEC AMPLI PROBE: HCPCS | Performed by: FAMILY MEDICINE

## 2021-11-10 PROCEDURE — U0003 INFECTIOUS AGENT DETECTION BY NUCLEIC ACID (DNA OR RNA); SEVERE ACUTE RESPIRATORY SYNDROME CORONAVIRUS 2 (SARS-COV-2) (CORONAVIRUS DISEASE [COVID-19]), AMPLIFIED PROBE TECHNIQUE, MAKING USE OF HIGH THROUGHPUT TECHNOLOGIES AS DESCRIBED BY CMS-2020-01-R: HCPCS | Performed by: FAMILY MEDICINE

## 2021-11-10 PROCEDURE — 99213 OFFICE O/P EST LOW 20 MIN: CPT | Performed by: FAMILY MEDICINE

## 2021-11-10 PROCEDURE — 87804 INFLUENZA ASSAY W/OPTIC: CPT | Performed by: FAMILY MEDICINE

## 2021-11-10 RX ORDER — HYDROXYZINE HYDROCHLORIDE 25 MG/1
TABLET, FILM COATED ORAL
COMMUNITY
Start: 2021-11-03

## 2021-11-10 NOTE — PATIENT INSTRUCTIONS
Drink plenty of fluids.  Plain water, herbal teas, Gatorade/Powerade are all great options.  Don't force yourself to eat if you're not hungry.  If you do feel hungry, eat things that are pretty bland like plain rice, yogurt, mashed potatoes, crackers, pretzels.  Once you're starting to feel better you can eat a wider variety of foods.    COVID test is pending.  Results likely tomorrow.  Stay isolated until we know this is negative.  You will get a phone call tomorrow (Thursday) or Friday if this is positive.  No phone call if negative.  If you don't hear anything by Friday evening, it is likely negative.  You can check results in [employer] online if you have that set up.    Use Tylenol or ibuprofen as needed for body aches.    Flu test today is negative.--hooray!

## 2021-11-10 NOTE — PROGRESS NOTES
ASSESSMENT:    ICD-10-CM    1. Stomach cramps, generalized  R10.84 Symptomatic COVID-19 Virus (Coronavirus) by PCR Nose     Influenza A/B antigen   2. Exposure to 2019 novel coronavirus  Z20.822      No significant findings on abdominal exam to suggest appendicitis, diverticulitis, or any other severe intra-abdominal process contributing to stomach cramping.  Loose stool resolving. Likely viral process.    PLAN:  Patient Instructions   Drink plenty of fluids.  Plain water, herbal teas, Gatorade/Powerade are all great options.  Don't force yourself to eat if you're not hungry.  If you do feel hungry, eat things that are pretty bland like plain rice, yogurt, mashed potatoes, crackers, pretzels.  Once you're starting to feel better you can eat a wider variety of foods.    COVID test is pending.  Results likely tomorrow.  Stay isolated until we know this is negative.  You will get a phone call tomorrow (Thursday) or Friday if this is positive.  No phone call if negative.  If you don't hear anything by Friday evening, it is likely negative.  You can check results in [employer] online if you have that set up.    Use Tylenol or ibuprofen as needed for body aches.    Flu test today is negative.--hooray!    SUBJECTIVE:  Israel Eaton is a 21 year old female who presents to  today with 2 days of fatigue, body aches, stomach cramping, and congestion.  She has some looser stools yesterday but these are improving.  COVID exposure on Friday.  Is fully vaccinated.    OBJECTIVE:  /68 (BP Location: Right arm, Patient Position: Sitting, Cuff Size: Adult Regular)   Pulse 71   Temp 98.3  F (36.8  C) (Oral)   Resp 16   SpO2 99%   GEN: well-appearing, in NAD, no labored breathing  ENT: normal TMs and pharynx  Neck: no LAD  Lungs CTAB    Results for orders placed or performed in visit on 11/10/21   Influenza A/B antigen     Status: Normal    Specimen: Nose; Swab   Result Value Ref Range    Influenza A antigen Negative Negative     Influenza B antigen Negative Negative    Narrative    Test results must be correlated with clinical data. If necessary, results should be confirmed by a molecular assay or viral culture.

## 2021-11-11 LAB — SARS-COV-2 RNA RESP QL NAA+PROBE: POSITIVE

## 2021-11-30 ENCOUNTER — VIRTUAL VISIT (OUTPATIENT)
Dept: FAMILY MEDICINE | Facility: CLINIC | Age: 21
End: 2021-11-30
Payer: COMMERCIAL

## 2021-11-30 DIAGNOSIS — F32.0 MILD SINGLE CURRENT EPISODE OF MAJOR DEPRESSIVE DISORDER (H): ICD-10-CM

## 2021-11-30 DIAGNOSIS — Z30.09 BIRTH CONTROL COUNSELING: ICD-10-CM

## 2021-11-30 DIAGNOSIS — F41.1 GENERALIZED ANXIETY DISORDER: Primary | ICD-10-CM

## 2021-11-30 PROCEDURE — 99213 OFFICE O/P EST LOW 20 MIN: CPT | Mod: 95 | Performed by: FAMILY MEDICINE

## 2021-11-30 RX ORDER — NORETHINDRONE ACETATE AND ETHINYL ESTRADIOL .03; 1.5 MG/1; MG/1
1 TABLET ORAL DAILY
Qty: 28 TABLET | Refills: 0 | Status: SHIPPED | OUTPATIENT
Start: 2021-11-30 | End: 2022-01-13

## 2021-11-30 ASSESSMENT — PATIENT HEALTH QUESTIONNAIRE - PHQ9: 5. POOR APPETITE OR OVEREATING: NEARLY EVERY DAY

## 2021-11-30 ASSESSMENT — ANXIETY QUESTIONNAIRES
IF YOU CHECKED OFF ANY PROBLEMS ON THIS QUESTIONNAIRE, HOW DIFFICULT HAVE THESE PROBLEMS MADE IT FOR YOU TO DO YOUR WORK, TAKE CARE OF THINGS AT HOME, OR GET ALONG WITH OTHER PEOPLE: VERY DIFFICULT
6. BECOMING EASILY ANNOYED OR IRRITABLE: NEARLY EVERY DAY
1. FEELING NERVOUS, ANXIOUS, OR ON EDGE: NEARLY EVERY DAY
7. FEELING AFRAID AS IF SOMETHING AWFUL MIGHT HAPPEN: NEARLY EVERY DAY
2. NOT BEING ABLE TO STOP OR CONTROL WORRYING: NEARLY EVERY DAY
GAD7 TOTAL SCORE: 21
3. WORRYING TOO MUCH ABOUT DIFFERENT THINGS: NEARLY EVERY DAY
5. BEING SO RESTLESS THAT IT IS HARD TO SIT STILL: NEARLY EVERY DAY

## 2021-11-30 NOTE — PROGRESS NOTES
Angelito is a 21 year old who is being evaluated via a billable video visit.      How would you like to obtain your AVS? MyChart  If the video visit is dropped, the invitation should be resent by: Text to cell phone: [phone]  Will anyone else be joining your video visit? No      Video Start Time: 4:02 PM    Assessment & Plan     Generalized anxiety disorder  - advised patient we are not certifiers for medical marijuana. She will have to schedule with a certifier. Can look up certifiers through [facility]. Continue with hydroxyzine since it works for her.     Mild single current episode of major depressive disorder (H)  -stable on zoloft    Birth control counseling  - has 1 week of OCP left. Will refill till her physical in a few weeks   - norethindrone-ethinyl estradiol (MICROGESTIN 1.5/30) 1.5-30 MG-MCG tablet; Take 1 tablet by mouth daily           See Patient Instructions    Return in about 17 days (around 12/17/2021) for Physical Exam (already scheduled).    Carmita Anderson MD  [facility]    Subjective   Angelito is a 21 year old who presents for the following health issues     HPI     Depression and Anxiety Follow-Up    How are you doing with your depression since your last visit? No change    How are you doing with your anxiety since your last visit?  Worsened     Are you having other symptoms that might be associated with depression or anxiety? No    Have you had a significant life event? Grief or Loss     Do you have any concerns with your use of alcohol or other drugs? No    Social History     Tobacco Use     Smoking status: Former Smoker     Types: Other     Smokeless tobacco: Never Used     Tobacco comment: quit end of August 2021   Vaping Use     Vaping Use: Former   Substance Use Topics     Alcohol use: No     Alcohol/week: 0.0 standard drinks     Drug use: No     PHQ 9/13/2021 11/12/2021 11/30/2021   PHQ-9 Total Score 8 13 13   Q9: Thoughts of better off dead/self-harm  "past 2 weeks Not at all Not at all Not at all     DAYO-7 SCORE 8/21/2021 9/13/2021 11/30/2021   Total Score 19 (severe anxiety) 13 (moderate anxiety) -   Total Score 19 13 21     Last PHQ-9 11/30/2021   1.  Little interest or pleasure in doing things 2   2.  Feeling down, depressed, or hopeless 1   3.  Trouble falling or staying asleep, or sleeping too much 3   4.  Feeling tired or having little energy 1   5.  Poor appetite or overeating 1   6.  Feeling bad about yourself 2   7.  Trouble concentrating 3   8.  Moving slowly or restless 0   Q9: Thoughts of better off dead/self-harm past 2 weeks 0   PHQ-9 Total Score 13   Difficulty at work, home, or with people Somewhat difficult     DAYO-7  11/30/2021   1. Feeling nervous, anxious, or on edge 3   2. Not being able to stop or control worrying 3   3. Worrying too much about different things 3   4. Trouble relaxing 3   5. Being so restless that it is hard to sit still 3   6. Becoming easily annoyed or irritable 3   7. Feeling afraid, as if something awful might happen 3   DAYO-7 Total Score 21   If you checked any problems, how difficult have they made it for you to do your work, take care of things at home, or get along with other people? Very difficult       Suicide Assessment Five-step Evaluation and Treatment (SAFE-T)      Patient is interested in medical marijuana because she was told by a friend that it works better for anxiety. States \"my friend who's anxiety is worse than mine started on it and is doing well\". Admits zoloft and hydroxyzine work but she is interested in using marijuana for her symptoms.   She is scheduled to see psych at [facility] in a few weeks.         Review of Systems   Constitutional, HEENT, cardiovascular, pulmonary, GI, , musculoskeletal, neuro, skin, endocrine and psych systems are negative, except as otherwise noted.      Objective           Vitals:  No vitals were obtained today due to virtual visit.    Physical Exam "   GENERAL: Healthy, alert and no distress  RESP: No audible wheeze, cough, or visible cyanosis.  No visible retractions or increased work of breathing.    PSYCH: Mentation appears normal, affect normal/bright, judgement and insight intact, normal speech and volume, fidgeting with object in her mouth     Video-Visit Details    Type of service:  Video Visit    Video End Time:4:08 pm     Originating Location (pt. Location): Home    Distant Location (provider location):  [facility] [place]     Platform used for Video Visit: [employer]

## 2021-12-01 ASSESSMENT — ANXIETY QUESTIONNAIRES: GAD7 TOTAL SCORE: 21

## 2021-12-01 ASSESSMENT — PATIENT HEALTH QUESTIONNAIRE - PHQ9: SUM OF ALL RESPONSES TO PHQ QUESTIONS 1-9: 13

## 2021-12-02 ENCOUNTER — MEDICAL CORRESPONDENCE (OUTPATIENT)
Dept: HEALTH INFORMATION MANAGEMENT | Facility: CLINIC | Age: 21
End: 2021-12-02
Payer: COMMERCIAL

## 2021-12-03 DIAGNOSIS — Z13.39 ADHD (ATTENTION DEFICIT HYPERACTIVITY DISORDER) EVALUATION: ICD-10-CM

## 2021-12-03 DIAGNOSIS — Z79.899 HIGH RISK MEDICATIONS (NOT ANTICOAGULANTS) LONG-TERM USE: Primary | ICD-10-CM

## 2021-12-03 DIAGNOSIS — F12.11 CANNABIS ABUSE, IN REMISSION: ICD-10-CM

## 2022-01-08 ENCOUNTER — HEALTH MAINTENANCE LETTER (OUTPATIENT)
Age: 22
End: 2022-01-08

## 2022-01-13 ENCOUNTER — TELEPHONE (OUTPATIENT)
Dept: FAMILY MEDICINE | Facility: CLINIC | Age: 22
End: 2022-01-13
Payer: COMMERCIAL

## 2022-01-13 DIAGNOSIS — Z30.09 BIRTH CONTROL COUNSELING: ICD-10-CM

## 2022-01-13 RX ORDER — NORETHINDRONE ACETATE AND ETHINYL ESTRADIOL 1.5; 3 MG/1; UG/1
TABLET ORAL
Qty: 84 TABLET | Refills: 3 | Status: SHIPPED | OUTPATIENT
Start: 2022-01-13

## 2022-01-13 NOTE — TELEPHONE ENCOUNTER
CONTRACEPTIVES  Last Office Visit:      ORAL/PATCH CONTRACEPTIVES--May also be prescribed for acne  NUVARING  OV: 12 mths  Max refills: 12 mths  Tests: annual exam  MAY REFILL ONE MONTH EXTRA IF DUE FOR EXAM AND VISIT SCHEDULED  MAY ALSO BE RX'd FOR ACNE      Selene Hinds/GISELE Stanley---[facility]

## 2022-01-13 NOTE — TELEPHONE ENCOUNTER
Routing refill request to provider for review/approval because:  Radha given x1 and patient did not follow up, please advise  No appointment scheduled, please confirm  Bree Kee RN, BSN  [employer]

## 2022-02-09 DIAGNOSIS — F90.9 ADHD (ATTENTION DEFICIT HYPERACTIVITY DISORDER): ICD-10-CM

## 2022-02-09 DIAGNOSIS — F12.11 HISTORY OF CANNABIS ABUSE: ICD-10-CM

## 2022-02-09 DIAGNOSIS — Z13.39 SCREENING FOR ALCOHOLISM: ICD-10-CM
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
## 2022-05-01 ENCOUNTER — APPOINTMENT (OUTPATIENT)
Dept: ULTRASOUND IMAGING | Facility: CLINIC | Age: 22
End: 2022-05-01
Attending: EMERGENCY MEDICINE
Payer: COMMERCIAL

## 2022-05-01 ENCOUNTER — HOSPITAL ENCOUNTER (EMERGENCY)
Facility: CLINIC | Age: 22
Discharge: HOME | End: 2022-05-01
Attending: EMERGENCY MEDICINE | Admitting: EMERGENCY MEDICINE
Payer: COMMERCIAL

## 2022-05-01 VITALS
SYSTOLIC BLOOD PRESSURE: 128 MMHG | OXYGEN SATURATION: 100 % | WEIGHT: 135 LBS | RESPIRATION RATE: 18 BRPM | DIASTOLIC BLOOD PRESSURE: 78 MMHG | HEART RATE: 78 BPM | BODY MASS INDEX: 23.17 KG/M2 | TEMPERATURE: 98.4 F

## 2022-05-01 DIAGNOSIS — R10.84 ABDOMINAL PAIN, GENERALIZED: ICD-10-CM

## 2022-05-01 LAB
ALBUMIN UR-MCNC: NEGATIVE MG/DL
ANION GAP SERPL CALCULATED.3IONS-SCNC: 2 MMOL/L (ref 3–14)
APPEARANCE UR: CLEAR
BACTERIA #/AREA URNS HPF: ABNORMAL /HPF
BASOPHILS # BLD AUTO: 0 10E3/UL (ref 0–0.2)
BASOPHILS NFR BLD AUTO: 0 %
BILIRUB UR QL STRIP: NEGATIVE
BUN SERPL-MCNC: 15 MG/DL (ref 7–30)
CALCIUM SERPL-MCNC: 9.3 MG/DL (ref 8.5–10.1)
CHLORIDE BLD-SCNC: 108 MMOL/L (ref 94–109)
CO2 SERPL-SCNC: 26 MMOL/L (ref 20–32)
COLOR UR AUTO: ABNORMAL
CREAT SERPL-MCNC: 0.62 MG/DL (ref 0.52–1.04)
EOSINOPHIL # BLD AUTO: 0.2 10E3/UL (ref 0–0.7)
EOSINOPHIL NFR BLD AUTO: 2 %
ERYTHROCYTE [DISTWIDTH] IN BLOOD BY AUTOMATED COUNT: 13.3 % (ref 10–15)
GFR SERPL CREATININE-BSD FRML MDRD: >90 ML/MIN/1.73M2
GLUCOSE BLD-MCNC: 100 MG/DL (ref 70–99)
GLUCOSE UR STRIP-MCNC: NEGATIVE MG/DL
HCG SERPL QL: NEGATIVE
HCT VFR BLD AUTO: 39.5 % (ref 35–47)
HGB BLD-MCNC: 12.4 G/DL (ref 11.7–15.7)
HGB UR QL STRIP: NEGATIVE
IMM GRANULOCYTES # BLD: 0 10E3/UL
IMM GRANULOCYTES NFR BLD: 0 %
KETONES UR STRIP-MCNC: NEGATIVE MG/DL
LEUKOCYTE ESTERASE UR QL STRIP: NEGATIVE
LYMPHOCYTES # BLD AUTO: 3 10E3/UL (ref 0.8–5.3)
LYMPHOCYTES NFR BLD AUTO: 38 %
MCH RBC QN AUTO: 28.1 PG (ref 26.5–33)
MCHC RBC AUTO-ENTMCNC: 31.4 G/DL (ref 31.5–36.5)
MCV RBC AUTO: 89 FL (ref 78–100)
MONOCYTES # BLD AUTO: 1.2 10E3/UL (ref 0–1.3)
MONOCYTES NFR BLD AUTO: 14 %
NEUTROPHILS # BLD AUTO: 3.6 10E3/UL (ref 1.6–8.3)
NEUTROPHILS NFR BLD AUTO: 46 %
NITRATE UR QL: NEGATIVE
NRBC # BLD AUTO: 0 10E3/UL
NRBC BLD AUTO-RTO: 0 /100
PH UR STRIP: 7 [PH] (ref 5–7)
PLATELET # BLD AUTO: 276 10E3/UL (ref 150–450)
POTASSIUM BLD-SCNC: 4 MMOL/L (ref 3.4–5.3)
RBC # BLD AUTO: 4.42 10E6/UL (ref 3.8–5.2)
RBC URINE: <1 /HPF
SODIUM SERPL-SCNC: 136 MMOL/L (ref 133–144)
SP GR UR STRIP: 1.01 (ref 1–1.03)
SQUAMOUS EPITHELIAL: 6 /HPF
UROBILINOGEN UR STRIP-MCNC: NORMAL MG/DL
WBC # BLD AUTO: 8 10E3/UL (ref 4–11)
WBC URINE: 1 /HPF

## 2022-05-01 PROCEDURE — 96374 THER/PROPH/DIAG INJ IV PUSH: CPT

## 2022-05-01 PROCEDURE — 96361 HYDRATE IV INFUSION ADD-ON: CPT

## 2022-05-01 PROCEDURE — 85025 COMPLETE CBC W/AUTO DIFF WBC: CPT | Performed by: EMERGENCY MEDICINE

## 2022-05-01 PROCEDURE — 84703 CHORIONIC GONADOTROPIN ASSAY: CPT | Performed by: EMERGENCY MEDICINE

## 2022-05-01 PROCEDURE — 250N000011 HC RX IP 250 OP 636: Performed by: EMERGENCY MEDICINE

## 2022-05-01 PROCEDURE — 36415 COLL VENOUS BLD VENIPUNCTURE: CPT | Performed by: EMERGENCY MEDICINE

## 2022-05-01 PROCEDURE — 93976 VASCULAR STUDY: CPT

## 2022-05-01 PROCEDURE — 80048 BASIC METABOLIC PNL TOTAL CA: CPT | Performed by: EMERGENCY MEDICINE

## 2022-05-01 PROCEDURE — 81001 URINALYSIS AUTO W/SCOPE: CPT | Performed by: EMERGENCY MEDICINE

## 2022-05-01 PROCEDURE — 99285 EMERGENCY DEPT VISIT HI MDM: CPT | Mod: 25

## 2022-05-01 PROCEDURE — 258N000003 HC RX IP 258 OP 636: Performed by: EMERGENCY MEDICINE

## 2022-05-01 RX ORDER — KETOROLAC TROMETHAMINE 15 MG/ML
15 INJECTION, SOLUTION INTRAMUSCULAR; INTRAVENOUS ONCE
Status: COMPLETED | OUTPATIENT
Start: 2022-05-01 | End: 2022-05-01

## 2022-05-01 RX ADMIN — KETOROLAC TROMETHAMINE 15 MG: 15 INJECTION, SOLUTION INTRAMUSCULAR; INTRAVENOUS at 03:53

## 2022-05-01 RX ADMIN — SODIUM CHLORIDE 1000 ML: 9 INJECTION, SOLUTION INTRAVENOUS at 03:53

## 2022-05-01 NOTE — ED TRIAGE NOTES
Pt arrives from home w/ complaint of abd pain, pt was laying in bed tonight when she had a sharp pain in her mid lower belly, reported then the pain went across her whole lower belly. Reports it became difficult to sit up due to pain. Denies N/V/D or urinary sx. Reports the pain is no longer sharp, reports pain is dull. LMP unknown, thinks it may have been mid March, reports recent negative pregnancy test. A&O x 4.

## 2022-05-01 NOTE — ED PROVIDER NOTES
Visit Date: 05/01/2022    CHIEF COMPLAINT:  Abdominal pain.    HISTORY OF PRESENT ILLNESS:  This is a 21-year-old female who presents with abdominal pain that has been bothering her since last evening.  She also noted some pain on Thursday.  She denies any vomiting, diarrhea, urinary symptoms.  She states the pain is across the entirety of her abdomen without radiation to one side.  No recent urinary symptoms.  She has no concerns of pregnancy.  No other complaints at this time.  She denies vaginal discharge as well.    PAST MEDICAL HISTORY:     1.  Celiac disease.  2.  Chronic rhinitis.  3.  Parotid gland cancer.  4.  Goiter.  5.  Major depression.    PAST SURGICAL HISTORY:  None.    MEDICATIONS:     1.  Microgestin.  2.  Zoloft.  3.  Atarax.    ALLERGIES:     1.  BARLEY GRASS.  2.  RYE GRASS.  3.  WHEAT BRAN.    SOCIAL HISTORY:  The patient presents with a male .  She is a former smoker.    REVIEW OF SYSTEMS:  A pertinent 10-point review of systems is negative, except for that noted in the HPI.    PHYSICAL EXAMINATION:    GENERAL:  The patient is resting comfortably in bed, appropriate with interaction.  VITAL SIGNS:  Blood pressure 124/71, heart rate 81, respiratory rate 18, oxygen 99% on room air, temperature 98.4 degrees.  HEENT:  Moist mucous membranes.  CARDIOVASCULAR:  Regular rhythm, normal rate.  No murmurs.  RESPIRATORY:  Clear to auscultation bilaterally without wheezes or rales.  GASTROINTESTINAL:  Soft, nondistended.  Normal bowel sounds.  Mild diffuse tenderness without localization.  No guarding or rigidity.  SKIN:  No pertinent skin findings.  PSYCHIATRIC:  The patient has normal mood and affect.  NEUROLOGIC:  She is alert and oriented x 4.       LABORATORY EVALUATION AND DIAGNOSTIC STUDIES:     LABORATORY:  Urinalysis is normal.  Serum pregnancy test is negative.  Basic metabolic panel and CBC are normal.     IMAGING:  Pelvic ultrasound with Doppler shows normal flow to both ovaries.  No  cysts.  No other abnormalities.  There is slight asymmetry and flow between the ovaries.    EMERGENCY DEPARTMENT COURSE AND MEDICAL DECISION MAKING:  This is a 21-year-old female who presents with generalized abdominal pain.  Workup here has been unremarkable.  She received IV dose of Toradol and feels much better.  She is not pregnant.  Urinalysis shows no signs of infection.  She has good flow to both ovaries.  Her exam is not concerning for ovarian torsion, so despite the slight asymmetry noted on the ultrasound, I would not consult Gynecology at this time.  Clinical concern for an acute surgical process such as bowel obstruction, appendicitis, perforation, etc., is very low.  No indication for CT scan, especially with her being asymptomatic at this time.  She is comfortable with this plan and has been given appropriate return precautions.    DIAGNOSIS:  Generalized abdominal pain.    PLAN OF CARE:  As above.    Dudley Slater MD        D: 2022   T: 2022   MT: TOMMY    Name:     DESMOND CROW  MRN:      [id number]        Account:    [id number]   :      2000           Visit Date: 2022     Document: [id number]

## 2022-05-01 NOTE — DISCHARGE INSTRUCTIONS
Discharge Instructions  Abdominal Pain    Abdominal pain (belly pain) can be caused by many things. Your evaluation today does not show the exact cause for your pain. Your provider today has decided that it is unlikely your pain is due to a life threatening problem, or a problem requiring surgery or hospital admission. Sometimes those problems cannot be found right away, so it is very important that you follow up as directed.  Sometimes only the changes which occur over time allow the cause of your pain to be found.    Generally, every Emergency Department visit should have a follow-up clinic visit with either a primary or a specialty clinic/provider. Please follow-up as instructed by your emergency provider today. With abdominal pain, we often recommend very close follow-up, such as the following day.    ADULTS:  Return to the Emergency Department right away if:    You get an oral temperature above 102oF or as directed by your provider.  You have blood in your stools. This may be bright red or appear as black, tarry stools.    You keep vomiting (throwing up) or cannot drink liquids.  You see blood when you vomit.   You cannot have a bowel movement or you cannot pass gas.  Your stomach gets bloated or bigger.  Your skin or the whites of your eyes look yellow.  You faint.  You have bloody, frequent or painful urination (peeing).  You have new symptoms or anything that worries you.    MORE INFORMATION:    Appendicitis:  A possible cause of abdominal pain in any person who still has their appendix is acute appendicitis. Appendicitis is often hard to diagnose.  Testing does not always rule out early appendicitis or other causes of abdominal pain. Close follow-up with your provider and re-evaluations may be needed to figure out the reason for your abdominal pain.    Follow-up:  It is very important that you make an appointment with your clinic and go to the appointment.  If you do not follow-up with your primary  We will arrange for you to see Dr. Lujan at the eye institute for consideration of lacrimal sac surgery right side in the attempt to reestablish tear drainage into your nose. Please call if you develop any severe pain or evidence of infection.   "provider, it may result in missing an important development which could result in permanent injury or disability and/or lasting pain.  If there is any problem keeping your appointment, call your provider or return to the Emergency Department.    Medications:  Take your medications as directed by your provider today.  Before using over-the-counter medications, ask your provider and make sure to take the medications as directed.  If you have any questions about medications, ask your provider.    Diet:  Resume your normal diet as much as possible, but do not eat fried, fatty or spicy foods while you have pain.  Do not drink alcohol or have caffeine.  Do not smoke tobacco.    Probiotics: If you have been given an antibiotic, you may want to also take a probiotic pill or eat yogurt with live cultures. Probiotics have \"good bacteria\" to help your intestines stay healthy. Studies have shown that probiotics help prevent diarrhea (loose stools) and other intestine problems (including C. diff infection) when you take antibiotics. You can buy these without a prescription in the pharmacy section of the store.     If you were given a prescription for medicine here today, be sure to read all of the information (including the package insert) that comes with your prescription.  This will include important information about the medicine, its side effects, and any warnings that you need to know about.  The pharmacist who fills the prescription can provide more information and answer questions you may have about the medicine.  If you have questions or concerns that the pharmacist cannot address, please call or return to the Emergency Department.       Remember that you can always come back to the Emergency Department if you are not able to see your regular provider in the amount of time listed above, if you get any new symptoms, or if there is anything that worries you.    "

## 2022-11-20 ENCOUNTER — HEALTH MAINTENANCE LETTER (OUTPATIENT)
Age: 22
End: 2022-11-20

## 2023-03-25 ENCOUNTER — APPOINTMENT (OUTPATIENT)
Dept: GENERAL RADIOLOGY | Facility: CLINIC | Age: 23
End: 2023-03-25
Attending: EMERGENCY MEDICINE
Payer: COMMERCIAL

## 2023-03-25 VITALS
HEART RATE: 94 BPM | TEMPERATURE: 97 F | RESPIRATION RATE: 20 BRPM | SYSTOLIC BLOOD PRESSURE: 123 MMHG | OXYGEN SATURATION: 98 % | DIASTOLIC BLOOD PRESSURE: 109 MMHG

## 2023-03-25 PROCEDURE — 87637 SARSCOV2&INF A&B&RSV AMP PRB: CPT | Performed by: EMERGENCY MEDICINE

## 2023-03-25 PROCEDURE — 99284 EMERGENCY DEPT VISIT MOD MDM: CPT | Mod: 25,CS

## 2023-03-25 PROCEDURE — 71046 X-RAY EXAM CHEST 2 VIEWS: CPT

## 2023-03-25 PROCEDURE — C9803 HOPD COVID-19 SPEC COLLECT: HCPCS

## 2023-03-26 ENCOUNTER — HOSPITAL ENCOUNTER (EMERGENCY)
Facility: CLINIC | Age: 23
Discharge: HOME | End: 2023-03-26
Attending: EMERGENCY MEDICINE | Admitting: EMERGENCY MEDICINE
Payer: COMMERCIAL

## 2023-03-26 DIAGNOSIS — R11.10 POST-TUSSIVE EMESIS: ICD-10-CM

## 2023-03-26 DIAGNOSIS — J06.9 ACUTE URI: ICD-10-CM

## 2023-03-26 LAB
FLUAV RNA SPEC QL NAA+PROBE: NEGATIVE
FLUBV RNA RESP QL NAA+PROBE: NEGATIVE
RSV RNA SPEC NAA+PROBE: NEGATIVE
SARS-COV-2 RNA RESP QL NAA+PROBE: NEGATIVE

## 2023-03-26 PROCEDURE — 250N000011 HC RX IP 250 OP 636: Performed by: EMERGENCY MEDICINE

## 2023-03-26 RX ORDER — ONDANSETRON 4 MG/1
4 TABLET, ORALLY DISINTEGRATING ORAL EVERY 8 HOURS PRN
Qty: 10 TABLET | Refills: 0 | Status: SHIPPED | OUTPATIENT
Start: 2023-03-26 | End: 2023-03-29

## 2023-03-26 RX ORDER — BENZONATATE 200 MG/1
200 CAPSULE ORAL 3 TIMES DAILY PRN
Qty: 10 CAPSULE | Refills: 0 | Status: SHIPPED | OUTPATIENT
Start: 2023-03-26

## 2023-03-26 RX ORDER — ONDANSETRON 4 MG/1
4 TABLET, ORALLY DISINTEGRATING ORAL ONCE
Status: COMPLETED | OUTPATIENT
Start: 2023-03-26 | End: 2023-03-26

## 2023-03-26 RX ADMIN — ONDANSETRON 4 MG: 4 TABLET, ORALLY DISINTEGRATING ORAL at 00:54

## 2023-03-26 NOTE — DISCHARGE INSTRUCTIONS

## 2023-03-26 NOTE — ED PROVIDER NOTES
History     Chief Complaint:  Cough       HPI   Israel Eaton is a 22 year old female who presents with a cough. The patient reports she developed a cough three days ago which has worsened over the last few days. She states she has had to force the cough and had episodes of vomiting with the cough. She endorses clear rhinorrhea, nausea, and loss of appetite. The patient's boyfriend has been ill and the patient denies history of asthma.       Independent Historian:   None - Patient Only    Review of External Notes:   Reviewed family medicine clinic visit from January 13, 2023    ROS:  Review of Systems  See HPI    Allergies:  Barley Grass  Rye Grass  Wheat Bran     Medications:    benzonatate (TESSALON) 200 MG capsule  ondansetron (ZOFRAN ODT) 4 MG ODT tab  hydrOXYzine (ATARAX) 25 MG tablet  MICROGESTIN 1.5-30 MG-MCG tablet  sertraline (ZOLOFT) 100 MG tablet      Past Medical History:    Past Medical History:   Diagnosis Date     Chronic rhinitis 7/1/2016     Goiter 7/1/2016     H/O malignant neoplasm of parotid gland 7/1/2016     Mild single current episode of major depressive disorder (H) 3/10/2017     Past Surgical History:    No past surgical history.  Family History:    family history includes No Known Problems in her father and mother.    Social History:   reports that she has quit smoking. Her smoking use included other. She has never used smokeless tobacco. She reports that she does not drink alcohol and does not use drugs.  PCP: Sean Koo     Physical Exam     Patient Vitals for the past 24 hrs:   BP Temp Temp src Pulse Resp SpO2   03/25/23 2330 (!) 123/109 97  F (36.1  C) Temporal 94 20 98 %        Physical Exam  Constitutional: Alert, attentive  HENT:    Nose normal.    Posterior pharynx shows no erythema or edema   Normal midline uvula   No peritonsillar erythema or swelling   No sublingual induration, swelling or tenderness   No trismus   Able to extend neck without discomfort   Tolerating  secretions and able to breathe supine with ease  Eyes: EOM are normal. Pupils are equal, round, and reactive to light.   CV: regular rate and rhythm; no murmurs, rubs or gallups  Chest: Effort normal and breath sounds normal.   GI:  There is no tenderness. No distension. Normal bowel sounds  MSK: Normal range of motion.   Neurological: Alert, attentive  Skin: Skin is warm and dry.      Emergency Department Course   Imaging:  Chest XR,  PA & LAT   Final Result   IMPRESSION: Negative chest.         Report per radiology    Laboratory:  Labs Ordered and Resulted from Time of ED Arrival to Time of ED Departure   INFLUENZA A/B, RSV, & SARS-COV2 PCR - Normal       Result Value    Influenza A PCR Negative      Influenza B PCR Negative      RSV PCR Negative      SARS CoV2 PCR Negative        Emergency Department Course & Assessments:    Interventions:  Medications   ondansetron (ZOFRAN ODT) ODT tab 4 mg (4 mg Oral $Given 3/26/23 0054)      Assessments:  0122 I assessed and examined the patient.    Independent Interpretation (X-rays, CTs, rhythm strip):  No pneumothorax or pneumonia on chest x-ray    Consultations/Discussion of Management or Tests:  None        Disposition:  The patient was discharged to home.     Impression & Plan    Medical Decision Making:  Israel Eaton is a 22 year old female presents with history and exam consistent with acute upper respiratory infection.   She is up-to-date on Tdap.  She is PERC negative, since ruling out PE the patient was low risk by Wells criteria.  Chest x-ray shows no pneumonia or other acute findings.  Viral panel is negative.  Plan continue supportive cares at home.  The patient may take Tylenol or ibuprofen for pain and fever, and I discussed supportive measures such as decongestants.  Return if increasing pain, fever, difficulty breathing, vomiting, or any other concerns.  Follow-up with primary physician in 3-5 days.      Diagnosis:    ICD-10-CM    1. Acute URI  J06.9        2. Post-tussive emesis  R11.10          Discharge Medications:  New Prescriptions    BENZONATATE (TESSALON) 200 MG CAPSULE    Take 1 capsule (200 mg) by mouth 3 times daily as needed for cough    ONDANSETRON (ZOFRAN ODT) 4 MG ODT TAB    Take 1 tablet (4 mg) by mouth every 8 hours as needed for nausea      Scribe Disclosure:  I, Mason Luis, am serving as a scribe at 1:35 AM on 3/26/2023 to document services personally performed by Dudley Lauren MD based on my observations and the provider's statements to me.     3/26/2023   Dudley Lauren MD [place], John Eric, MD  03/26/23 2468

## 2023-03-26 NOTE — ED TRIAGE NOTES
"Pt reports \"whooping\" cough x 2 days. Denies fever, SOB, CP. Does report vomiting due to coughing. VSS. ABCs intact. A/Ox4.       "

## 2023-06-20 ENCOUNTER — NURSE TRIAGE (OUTPATIENT)
Dept: NURSING | Facility: CLINIC | Age: 23
End: 2023-06-20
Payer: COMMERCIAL

## 2023-06-21 NOTE — TELEPHONE ENCOUNTER
Triage Call:    Caller: Patient     Patient has had a bladder infection for approx 4 weeks.  Was seen 3 weeks ago and took meds for 1 week and now it is coming back again.      She is having pain with urination, she has been taking ibuprofen, and it helps with the pain.  Rating pain right now 4/10, but with urination it was at 8/10.       Denies back pain.     Protocol Recommended Disposition:  Be seen in the next 24 hours.  Discussed many home care strategies with water intake, clothing choices, sitz bath, urination after intercourse.    Advised to seek care with PCP at [facility] in [place] tomorrow, as they have been treating this round of UTI so far.       Caller verbalized understanding of instructions and questions answered.      Ani Eastman RN on 6/20/2023 at 10:43 PM    Reason for Disposition    > 2 UTI's in last year    Additional Information    Negative: Shock suspected (e.g., cold/pale/clammy skin, too weak to stand, low BP, rapid pulse)    Negative: Sounds like a life-threatening emergency to the triager    Negative: [1] Unable to urinate (or only a few drops) > 4 hours AND [2] bladder feels very full (e.g., palpable bladder or strong urge to urinate)    Negative: Vomiting    Negative: Patient sounds very sick or weak to the triager    Negative: [1] SEVERE pain with urination (e.g., excruciating) AND [2] not improved after 2 hours of pain medicine and Sitz bath    Negative: Fever > 100.4 F (38.0 C)    Negative: Side (flank) or lower back pain present    Negative: Diabetes mellitus or weak immune system (e.g., HIV positive, cancer chemo, splenectomy, organ transplant, chronic steroids)    Negative: Bedridden (e.g., nursing home patient, CVA, chronic illness, recovering from surgery)    Negative: Artificial heart valve or artificial joint    Negative: Unusual vaginal discharge (e.g., bad smelling, yellow, green, or foamy-white)    Negative: Patient is worried they have a sexually transmitted  infection (STI)    Negative: Possibility of pregnancy    Negative: Blood in urine (red, pink, or tea-colored)    Negative: Age > 50 years    Protocols used: URINATION PAIN - FEMALE-A-AH

## 2023-07-08 ENCOUNTER — HEALTH MAINTENANCE LETTER (OUTPATIENT)
Age: 23
End: 2023-07-08

## 2024-02-29 ENCOUNTER — HOSPITAL ENCOUNTER (EMERGENCY)
Facility: CLINIC | Age: 24
Discharge: HOME | End: 2024-02-29
Attending: EMERGENCY MEDICINE | Admitting: EMERGENCY MEDICINE
Payer: MEDICARE

## 2024-02-29 VITALS
HEART RATE: 78 BPM | WEIGHT: 175 LBS | BODY MASS INDEX: 29.16 KG/M2 | RESPIRATION RATE: 18 BRPM | SYSTOLIC BLOOD PRESSURE: 118 MMHG | OXYGEN SATURATION: 99 % | TEMPERATURE: 98.2 F | HEIGHT: 65 IN | DIASTOLIC BLOOD PRESSURE: 100 MMHG

## 2024-02-29 DIAGNOSIS — R14.0 ABDOMINAL BLOATING: ICD-10-CM

## 2024-02-29 LAB
ALBUMIN SERPL BCG-MCNC: 4.3 G/DL (ref 3.5–5.2)
ALBUMIN UR-MCNC: 20 MG/DL
ALP SERPL-CCNC: 57 U/L (ref 40–150)
ALT SERPL W P-5'-P-CCNC: 13 U/L (ref 0–50)
ANION GAP SERPL CALCULATED.3IONS-SCNC: 10 MMOL/L (ref 7–15)
APPEARANCE UR: ABNORMAL
AST SERPL W P-5'-P-CCNC: 13 U/L (ref 0–45)
BACTERIA #/AREA URNS HPF: ABNORMAL /HPF
BASOPHILS # BLD AUTO: 0 10E3/UL (ref 0–0.2)
BASOPHILS NFR BLD AUTO: 1 %
BILIRUB SERPL-MCNC: 0.2 MG/DL
BILIRUB UR QL STRIP: NEGATIVE
BUN SERPL-MCNC: 9.8 MG/DL (ref 6–20)
CALCIUM SERPL-MCNC: 9.7 MG/DL (ref 8.6–10)
CHLORIDE SERPL-SCNC: 102 MMOL/L (ref 98–107)
COLOR UR AUTO: YELLOW
CREAT SERPL-MCNC: 0.87 MG/DL (ref 0.51–0.95)
DEPRECATED HCO3 PLAS-SCNC: 26 MMOL/L (ref 22–29)
EGFRCR SERPLBLD CKD-EPI 2021: >90 ML/MIN/1.73M2
EOSINOPHIL # BLD AUTO: 0.3 10E3/UL (ref 0–0.7)
EOSINOPHIL NFR BLD AUTO: 4 %
ERYTHROCYTE [DISTWIDTH] IN BLOOD BY AUTOMATED COUNT: 12.9 % (ref 10–15)
GLUCOSE SERPL-MCNC: 97 MG/DL (ref 70–99)
GLUCOSE UR STRIP-MCNC: NEGATIVE MG/DL
HCG SERPL QL: NEGATIVE
HCT VFR BLD AUTO: 42.3 % (ref 35–47)
HGB BLD-MCNC: 13.8 G/DL (ref 11.7–15.7)
HGB UR QL STRIP: NEGATIVE
HOLD SPECIMEN: NORMAL
IMM GRANULOCYTES # BLD: 0 10E3/UL
IMM GRANULOCYTES NFR BLD: 0 %
KETONES UR STRIP-MCNC: NEGATIVE MG/DL
LEUKOCYTE ESTERASE UR QL STRIP: ABNORMAL
LIPASE SERPL-CCNC: 53 U/L (ref 13–60)
LYMPHOCYTES # BLD AUTO: 3.1 10E3/UL (ref 0.8–5.3)
LYMPHOCYTES NFR BLD AUTO: 38 %
MCH RBC QN AUTO: 28.9 PG (ref 26.5–33)
MCHC RBC AUTO-ENTMCNC: 32.6 G/DL (ref 31.5–36.5)
MCV RBC AUTO: 89 FL (ref 78–100)
MONOCYTES # BLD AUTO: 0.6 10E3/UL (ref 0–1.3)
MONOCYTES NFR BLD AUTO: 7 %
MUCOUS THREADS #/AREA URNS LPF: PRESENT /LPF
NEUTROPHILS # BLD AUTO: 4.2 10E3/UL (ref 1.6–8.3)
NEUTROPHILS NFR BLD AUTO: 50 %
NITRATE UR QL: NEGATIVE
NRBC # BLD AUTO: 0 10E3/UL
NRBC BLD AUTO-RTO: 0 /100
PH UR STRIP: 5.5 [PH] (ref 5–7)
PLATELET # BLD AUTO: 334 10E3/UL (ref 150–450)
POTASSIUM SERPL-SCNC: 3.7 MMOL/L (ref 3.4–5.3)
PROT SERPL-MCNC: 7.7 G/DL (ref 6.4–8.3)
RBC # BLD AUTO: 4.78 10E6/UL (ref 3.8–5.2)
RBC URINE: 2 /HPF
SODIUM SERPL-SCNC: 138 MMOL/L (ref 135–145)
SP GR UR STRIP: 1.03 (ref 1–1.03)
SQUAMOUS EPITHELIAL: 11 /HPF
UROBILINOGEN UR STRIP-MCNC: NORMAL MG/DL
WBC # BLD AUTO: 8.3 10E3/UL (ref 4–11)
WBC URINE: 9 /HPF

## 2024-02-29 PROCEDURE — 36415 COLL VENOUS BLD VENIPUNCTURE: CPT | Performed by: EMERGENCY MEDICINE

## 2024-02-29 PROCEDURE — 83690 ASSAY OF LIPASE: CPT | Performed by: EMERGENCY MEDICINE

## 2024-02-29 PROCEDURE — 84703 CHORIONIC GONADOTROPIN ASSAY: CPT | Performed by: EMERGENCY MEDICINE

## 2024-02-29 PROCEDURE — 85004 AUTOMATED DIFF WBC COUNT: CPT | Performed by: EMERGENCY MEDICINE

## 2024-02-29 PROCEDURE — 81001 URINALYSIS AUTO W/SCOPE: CPT | Performed by: EMERGENCY MEDICINE

## 2024-02-29 PROCEDURE — 82247 BILIRUBIN TOTAL: CPT | Performed by: EMERGENCY MEDICINE

## 2024-02-29 PROCEDURE — 99283 EMERGENCY DEPT VISIT LOW MDM: CPT

## 2024-02-29 ASSESSMENT — COLUMBIA-SUICIDE SEVERITY RATING SCALE - C-SSRS
2. HAVE YOU ACTUALLY HAD ANY THOUGHTS OF KILLING YOURSELF IN THE PAST MONTH?: NO
1. IN THE PAST MONTH, HAVE YOU WISHED YOU WERE DEAD OR WISHED YOU COULD GO TO SLEEP AND NOT WAKE UP?: NO
6. HAVE YOU EVER DONE ANYTHING, STARTED TO DO ANYTHING, OR PREPARED TO DO ANYTHING TO END YOUR LIFE?: NO

## 2024-02-29 ASSESSMENT — ACTIVITIES OF DAILY LIVING (ADL): ADLS_ACUITY_SCORE: 33

## 2024-03-01 NOTE — ED TRIAGE NOTES
Here for concern of abdominal distention/bloating tonight associated with nausea. Denies any pain but stated that her abdomen feels hard. Stated history of Celiac disease. ABCs intact.     Triage Assessment (Adult)       Row Name 02/29/24 2021          Triage Assessment    Airway WDL WDL        Respiratory WDL    Respiratory WDL WDL        Cardiac WDL    Cardiac WDL WDL

## 2024-03-01 NOTE — ED PROVIDER NOTES
"    History     Chief Complaint:  Abdominal bloating       HPI   Israel Eaton is a 23 year old female who presents with abdominal bloating.  She notes being on amoxicillin for a dental infection recently and has had intermittent \"stomachache\" for the last few days.  She is not having pain currently but notes some bloating sensation and that her stomach felt hard earlier at 6 PM today.  She denies nausea or vomiting, fever, diarrhea, constipation, or any other concerns.        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    Mouth/Throat: Oropharynx is clear, mucous membranes are moist   Eyes: EOM are normal.   CV: regular rate and rhythm; no murmurs, rubs or gallups  Chest: Effort normal and breath sounds normal.   GI:  There is no tenderness. No distension. Normal bowel sounds  MSK: Normal range of motion.   Neurological: Alert, attentive  Skin: Skin is warm and dry.      Emergency Department Course       Results for orders placed or performed during the hospital encounter of 02/29/24   Comprehensive metabolic panel     Status: Normal   Result Value Ref Range    Sodium 138 135 - 145 mmol/L    Potassium 3.7 3.4 - 5.3 mmol/L    Carbon Dioxide (CO2) 26 22 - 29 mmol/L "    Anion Gap 10 7 - 15 mmol/L    Urea Nitrogen 9.8 6.0 - 20.0 mg/dL    Creatinine 0.87 0.51 - 0.95 mg/dL    GFR Estimate >90 >60 mL/min/1.73m2    Calcium 9.7 8.6 - 10.0 mg/dL    Chloride 102 98 - 107 mmol/L    Glucose 97 70 - 99 mg/dL    Alkaline Phosphatase 57 40 - 150 U/L    AST 13 0 - 45 U/L    ALT 13 0 - 50 U/L    Protein Total 7.7 6.4 - 8.3 g/dL    Albumin 4.3 3.5 - 5.2 g/dL    Bilirubin Total 0.2 <=1.2 mg/dL   [place] Draw     Status: None    Narrative    The following orders were created for panel order [place] Draw.  Procedure                               Abnormality         Status                     ---------                               -----------         ------                     Extra Blue Top Tube[[id number]]                              Final result                 Please view results for these tests on the individual orders.   Lipase     Status: Normal   Result Value Ref Range    Lipase 53 13 - 60 U/L   UA with Microscopic reflex to Culture     Status: Abnormal    Specimen: Urine, Midstream   Result Value Ref Range    Color Urine Yellow Colorless, Straw, Light Yellow, Yellow    Appearance Urine Slightly Cloudy (A) Clear    Glucose Urine Negative Negative mg/dL    Bilirubin Urine Negative Negative    Ketones Urine Negative Negative mg/dL    Specific Gravity Urine 1.026 1.003 - 1.035    Blood Urine Negative Negative    pH Urine 5.5 5.0 - 7.0    Protein Albumin Urine 20 (A) Negative mg/dL    Urobilinogen Urine Normal Normal, 2.0 mg/dL    Nitrite Urine Negative Negative    Leukocyte Esterase Urine Trace (A) Negative    Bacteria Urine Few (A) None Seen /HPF    Mucus Urine Present (A) None Seen /LPF    RBC Urine 2 <=2 /HPF    WBC Urine 9 (H) <=5 /HPF    Squamous Epithelials Urine 11 (H) <=1 /HPF    Narrative    Urine Culture not indicated   HCG QUALitative pregnancy (blood)     Status: Normal   Result Value Ref Range    hCG Serum Qualitative Negative Negative   CBC with platelets and differential      "Status: None   Result Value Ref Range    WBC Count 8.3 4.0 - 11.0 10e3/uL    RBC Count 4.78 3.80 - 5.20 10e6/uL    Hemoglobin 13.8 11.7 - 15.7 g/dL    Hematocrit 42.3 35.0 - 47.0 %    MCV 89 78 - 100 fL    MCH 28.9 26.5 - 33.0 pg    MCHC 32.6 31.5 - 36.5 g/dL    RDW 12.9 10.0 - 15.0 %    Platelet Count 334 150 - 450 10e3/uL    % Neutrophils 50 %    % Lymphocytes 38 %    % Monocytes 7 %    % Eosinophils 4 %    % Basophils 1 %    % Immature Granulocytes 0 %    NRBCs per 100 WBC 0 <1 /100    Absolute Neutrophils 4.2 1.6 - 8.3 10e3/uL    Absolute Lymphocytes 3.1 0.8 - 5.3 10e3/uL    Absolute Monocytes 0.6 0.0 - 1.3 10e3/uL    Absolute Eosinophils 0.3 0.0 - 0.7 10e3/uL    Absolute Basophils 0.0 0.0 - 0.2 10e3/uL    Absolute Immature Granulocytes 0.0 <=0.4 10e3/uL    Absolute NRBCs 0.0 10e3/uL   Extra Blue Top Tube     Status: None   Result Value Ref Range    Hold Specimen JIC    CBC with Platelets & Differential     Status: None    Narrative    The following orders were created for panel order CBC with Platelets & Differential.  Procedure                               Abnormality         Status                     ---------                               -----------         ------                     CBC with platelets and d...[[id number]]                      Final result                 Please view results for these tests on the individual orders.       Emergency Department Course & Assessments:         Disposition:  The patient was discharged to home.     Impression & Plan      Medical Decision Making:  This is a 23-year-old female, currently on amoxicillin for dental abscess, who presents for evaluation of abdominal bloating.  She has had a \"stomachache\" but has no pain currently, has no abdominal tenderness, and no other associated symptoms to suggest SBO or other acute process.  Screening labs are reassuring.  She is not pregnant there is no evidence of UTI.  I had an extended shared decision-making conversation with " the patient and her mother.  They would like to avoid imaging at this time and weakness reasonable.  Her discomfort/bloating could be related to amoxicillin therapy, which she complains of 2 days.  Plan continue and complete this course, transition to basic diet, and primary care follow-up for recheck.  Return precautions for worse pain, fever, vomiting, or any concerns.        Diagnosis:  Visit Diagnosis, Associated Orders, and Comments     ICD-10-CM    1. Abdominal bloating  R14.0              Dudley Lauren MD  02/29/24 3612

## 2024-04-07 ENCOUNTER — HEALTH MAINTENANCE LETTER (OUTPATIENT)
Age: 24
End: 2024-04-07

## 2024-08-25 ENCOUNTER — HEALTH MAINTENANCE LETTER (OUTPATIENT)
Age: 24
End: 2024-08-25

## 2024-11-21 ENCOUNTER — APPOINTMENT (OUTPATIENT)
Dept: GENERAL RADIOLOGY | Facility: CLINIC | Age: 24
End: 2024-11-21
Attending: PHYSICIAN ASSISTANT
Payer: COMMERCIAL

## 2024-11-21 ENCOUNTER — HOSPITAL ENCOUNTER (EMERGENCY)
Facility: CLINIC | Age: 24
Discharge: HOME | End: 2024-11-21
Attending: PHYSICIAN ASSISTANT | Admitting: PHYSICIAN ASSISTANT
Payer: COMMERCIAL

## 2024-11-21 VITALS
BODY MASS INDEX: 31.33 KG/M2 | RESPIRATION RATE: 22 BRPM | HEIGHT: 65 IN | OXYGEN SATURATION: 98 % | HEART RATE: 68 BPM | DIASTOLIC BLOOD PRESSURE: 85 MMHG | WEIGHT: 188.05 LBS | TEMPERATURE: 98.6 F | SYSTOLIC BLOOD PRESSURE: 139 MMHG

## 2024-11-21 DIAGNOSIS — S61.412A LACERATION OF LEFT HAND WITHOUT FOREIGN BODY, INITIAL ENCOUNTER: ICD-10-CM

## 2024-11-21 PROCEDURE — 250N000011 HC RX IP 250 OP 636: Performed by: PHYSICIAN ASSISTANT

## 2024-11-21 PROCEDURE — 90715 TDAP VACCINE 7 YRS/> IM: CPT | Performed by: PHYSICIAN ASSISTANT

## 2024-11-21 PROCEDURE — 12002 RPR S/N/AX/GEN/TRNK2.6-7.5CM: CPT

## 2024-11-21 PROCEDURE — 99283 EMERGENCY DEPT VISIT LOW MDM: CPT | Mod: 25

## 2024-11-21 PROCEDURE — 90471 IMMUNIZATION ADMIN: CPT | Performed by: PHYSICIAN ASSISTANT

## 2024-11-21 PROCEDURE — 73130 X-RAY EXAM OF HAND: CPT | Mod: LT

## 2024-11-21 RX ADMIN — CLOSTRIDIUM TETANI TOXOID ANTIGEN (FORMALDEHYDE INACTIVATED), CORYNEBACTERIUM DIPHTHERIAE TOXOID ANTIGEN (FORMALDEHYDE INACTIVATED), BORDETELLA PERTUSSIS TOXOID ANTIGEN (GLUTARALDEHYDE INACTIVATED), BORDETELLA PERTUSSIS FILAMENTOUS HEMAGGLUTININ ANTIGEN (FORMALDEHYDE INACTIVATED), BORDETELLA PERTUSSIS PERTACTIN ANTIGEN, AND BORDETELLA PERTUSSIS FIMBRIAE 2/3 ANTIGEN 0.5 ML: 5; 2; 2.5; 5; 3; 5 INJECTION, SUSPENSION INTRAMUSCULAR at 17:34

## 2024-11-21 ASSESSMENT — ACTIVITIES OF DAILY LIVING (ADL)
ADLS_ACUITY_SCORE: 0
ADLS_ACUITY_SCORE: 0

## 2024-11-21 ASSESSMENT — COLUMBIA-SUICIDE SEVERITY RATING SCALE - C-SSRS
1. IN THE PAST MONTH, HAVE YOU WISHED YOU WERE DEAD OR WISHED YOU COULD GO TO SLEEP AND NOT WAKE UP?: NO
6. HAVE YOU EVER DONE ANYTHING, STARTED TO DO ANYTHING, OR PREPARED TO DO ANYTHING TO END YOUR LIFE?: NO
2. HAVE YOU ACTUALLY HAD ANY THOUGHTS OF KILLING YOURSELF IN THE PAST MONTH?: NO

## 2024-11-21 NOTE — ED TRIAGE NOTES
Patient ambulatory reporting hand injury. Patient slammed a ceramic dish against a counter, dish broke and cut her hand. Patient unsure if anything foreign is in hand. CMS intact, bleeding controlled with dish towel in triage. Patient very tearful in triage.

## 2024-11-21 NOTE — ED PROVIDER NOTES
Laceration Repair      Procedure: Laceration Repair    Indication: Laceration    Consent: Verbal    Tetanus status reviewed: 06/27/2024, updated today 11/21/2024    Location: Left L fourth (ring) finger    Length: 4 cm    Preparation: Irrigation with Sterile Saline.    Anesthesia/Sedation: Bupivacaine - 0.5%      Treatment/Exploration: Wound explored, no foreign bodies found     Closure: The wound was closed with one layer. Skin/superficial layer was closed with 7 x 4-0 Nylon using Interrupted sutures.     Patient Status: The patient tolerated the procedure well: Yes. There were no complications.       Lavon, Isaak, BENJI  11/21/24 [id number]

## 2024-11-21 NOTE — ED PROVIDER NOTES
"  Emergency Department Note      History of Present Illness     Chief Complaint   Hand Injury and Laceration    HPI   Israel Eaton is a 24 year old female who presents with injury to her left hand. She cut her hand on a broken ceramic object today.  Last tetanus was 2014.  Laceration to palmar aspect of the left hand overlying the MCP joint of the left ring finger    Independent Historian       Review of External Notes     Past Medical History     Medical History and Problem List   ADHD   Acinic cell adenocarcinoma   Borderline personality disorder   Chronic rhinitis  Celiac disease   De Quervain's tenosynovitis, right   DAYO  Goiter  Salivary gland carcinoma   Malignant neoplasm of parotid gland   MDD  Lactose intolerance  PTSD     Medications   Tessalon   Atarax   Zoloft     Surgical History   Myringotomy with tube placement  Parotid gland tumor     Physical Exam     Patient Vitals for the past 24 hrs:   BP Temp Pulse Resp SpO2 Height Weight   11/21/24 1542 (!) 143/106 98.6  F (37  C) 105 22 99 % 1.651 m (5' 5\") 85.3 kg (188 lb 0.8 oz)     Physical Exam  Musculoskeletal:        Hands:          General:Vitals signs reviewed  Psych: Normal Affect  Eyes: Non icteric , non inject  Psych: Normal Affect  Lungs: Non labored breathing  Skin: Laceration over the MCP joint of the palmar surface of the left ring finger to the hand.  No visible tendon.  Neuro: Normal mentation, Sensation intact distal to injury. Normal strength of of the left ring finger with PIP DIP and MCP joint range of motion  Vascular: Cap refill < 2 sec distal to injury,   Musculoskeletal: Left ring finger tenderness over area of wound.  There is no visible foreign body within the wound.  No bruising swelling.  Normal range of motion      Diagnostics     Lab Results   Labs Ordered and Resulted from Time of ED Arrival to Time of ED Departure - No data to display    Imaging   XR Hand Left G/E 3 Views   Final Result   IMPRESSION: Normal joint spaces and " alignment. No fracture. There is no evidence of a radiopaque foreign body.        Independent Interpretation   X-ray left hand shows no fracture dislocation or radiopaque foreign body of the left hand.    ED Course      Medications Administered   Medications   Tdap (tetanus-diphtheria-acell pertussis) (ADACEL) injection 0.5 mL (0.5 mLs Intramuscular $Given 11/21/24 1676)       Procedures   Procedures     Please see Isaak oDll PA-C 's note for procedure details    Discussion of Management   None    ED Course        Additional Documentation  None    Medical Decision Making / Diagnosis     CMS Diagnoses: None    MIPS       None    MDM     This is a very pleasant 24 year old female who presented with a laceration to the left hand.  The wound was carefully evaluated and explored.  The laceration was closed with sutures as noted herein.  Sutures placed and procedure performed by my colleague Lavon LEBLANC under my direct supervision. There is no evidence of muscular, tendon, or bony damage with this laceration.  Possible complications (infection, scarring) were reviewed with the patient.  Follow up with primary care will be indicated for suture removal in 7 days.  I advised strict return precautions for pain, redness or drainage to the site, bleeding, or any other concerning symptoms.      Disposition   The patient was discharged.     Diagnosis     ICD-10-CM    1. Laceration of left hand without foreign body, initial encounter  S61.412A            Discharge Medications   New Prescriptions    No medications on file     Chucho Sousa PA-C, PA-C  11/21/24 [id number]

## 2024-11-22 NOTE — ED NOTES
Pt reports she was angry this morning and slammed a glass bowl onto the counter and didn't let go of it and is now concerned there might be glass in her left hand. Lac is about an inch on her left hand and goes up into her finger. Pt reports pain is now managed with lidocaine.

## (undated) RX ORDER — BUPIVACAINE HYDROCHLORIDE 5 MG/ML
INJECTION, SOLUTION EPIDURAL; INTRACAUDAL
Status: DISPENSED
Start: 2024-11-21